# Patient Record
Sex: FEMALE | Race: BLACK OR AFRICAN AMERICAN | Employment: OTHER | URBAN - METROPOLITAN AREA
[De-identification: names, ages, dates, MRNs, and addresses within clinical notes are randomized per-mention and may not be internally consistent; named-entity substitution may affect disease eponyms.]

---

## 2017-03-01 ENCOUNTER — HOSPITAL ENCOUNTER (OUTPATIENT)
Dept: BONE DENSITY | Age: 67
Discharge: HOME | End: 2017-03-01
Payer: COMMERCIAL

## 2017-03-01 DIAGNOSIS — Z13.820 ENCOUNTER FOR SCREENING FOR OSTEOPOROSIS: ICD-10-CM

## 2017-03-01 PROCEDURE — 77080 DXA BONE DENSITY AXIAL: CPT

## 2017-05-22 ENCOUNTER — HOSPITAL ENCOUNTER (OUTPATIENT)
Dept: PREADMISSION TESTING | Age: 67
Discharge: HOME | End: 2017-05-22
Payer: COMMERCIAL

## 2017-05-22 VITALS
DIASTOLIC BLOOD PRESSURE: 88 MMHG | RESPIRATION RATE: 20 BRPM | HEIGHT: 68 IN | WEIGHT: 201.13 LBS | SYSTOLIC BLOOD PRESSURE: 149 MMHG | BODY MASS INDEX: 30.48 KG/M2 | HEART RATE: 69 BPM | TEMPERATURE: 97.9 F

## 2017-05-22 LAB
ABO + RH BLD: NORMAL
ANION GAP BLD CALC-SCNC: 6 MMOL/L (ref 5–15)
APPEARANCE UR: CLEAR
ATRIAL RATE: 70 BPM
BACTERIA URNS QL MICRO: NEGATIVE /HPF
BILIRUB UR QL: NEGATIVE
BLOOD GROUP ANTIBODIES SERPL: NORMAL
BUN SERPL-MCNC: 11 MG/DL (ref 6–20)
BUN/CREAT SERPL: 12 (ref 12–20)
CALCIUM SERPL-MCNC: 9.1 MG/DL (ref 8.5–10.1)
CALCULATED P AXIS, ECG09: 54 DEGREES
CALCULATED R AXIS, ECG10: 49 DEGREES
CALCULATED T AXIS, ECG11: -11 DEGREES
CHLORIDE SERPL-SCNC: 103 MMOL/L (ref 97–108)
CO2 SERPL-SCNC: 32 MMOL/L (ref 21–32)
COLOR UR: ABNORMAL
CREAT SERPL-MCNC: 0.94 MG/DL (ref 0.55–1.02)
DIAGNOSIS, 93000: NORMAL
EPITH CASTS URNS QL MICRO: ABNORMAL /LPF
ERYTHROCYTE [DISTWIDTH] IN BLOOD BY AUTOMATED COUNT: 14.8 % (ref 11.5–14.5)
EST. AVERAGE GLUCOSE BLD GHB EST-MCNC: 126 MG/DL
GLUCOSE SERPL-MCNC: 105 MG/DL (ref 65–100)
GLUCOSE UR STRIP.AUTO-MCNC: NEGATIVE MG/DL
HBA1C MFR BLD: 6 % (ref 4.2–6.3)
HCT VFR BLD AUTO: 39.1 % (ref 35–47)
HGB BLD-MCNC: 12.2 G/DL (ref 11.5–16)
HGB UR QL STRIP: NEGATIVE
HYALINE CASTS URNS QL MICRO: ABNORMAL /LPF (ref 0–5)
INR PPP: 1 (ref 0.9–1.1)
KETONES UR QL STRIP.AUTO: NEGATIVE MG/DL
LEUKOCYTE ESTERASE UR QL STRIP.AUTO: NEGATIVE
MCH RBC QN AUTO: 27.6 PG (ref 26–34)
MCHC RBC AUTO-ENTMCNC: 31.2 G/DL (ref 30–36.5)
MCV RBC AUTO: 88.5 FL (ref 80–99)
NITRITE UR QL STRIP.AUTO: NEGATIVE
P-R INTERVAL, ECG05: 148 MS
PH UR STRIP: 5.5 [PH] (ref 5–8)
PLATELET # BLD AUTO: 260 K/UL (ref 150–400)
POTASSIUM SERPL-SCNC: 4.2 MMOL/L (ref 3.5–5.1)
PROT UR STRIP-MCNC: NEGATIVE MG/DL
PROTHROMBIN TIME: 10.4 SEC (ref 9–11.1)
Q-T INTERVAL, ECG07: 376 MS
QRS DURATION, ECG06: 82 MS
QTC CALCULATION (BEZET), ECG08: 406 MS
RBC # BLD AUTO: 4.42 M/UL (ref 3.8–5.2)
RBC #/AREA URNS HPF: ABNORMAL /HPF (ref 0–5)
SODIUM SERPL-SCNC: 141 MMOL/L (ref 136–145)
SP GR UR REFRACTOMETRY: 1.02 (ref 1–1.03)
SPECIMEN EXP DATE BLD: NORMAL
UA: UC IF INDICATED,UAUC: ABNORMAL
UROBILINOGEN UR QL STRIP.AUTO: 0.2 EU/DL (ref 0.2–1)
VENTRICULAR RATE, ECG03: 70 BPM
WBC # BLD AUTO: 6.2 K/UL (ref 3.6–11)
WBC URNS QL MICRO: ABNORMAL /HPF (ref 0–4)

## 2017-05-22 PROCEDURE — 80048 BASIC METABOLIC PNL TOTAL CA: CPT | Performed by: ORTHOPAEDIC SURGERY

## 2017-05-22 PROCEDURE — 86900 BLOOD TYPING SEROLOGIC ABO: CPT | Performed by: ORTHOPAEDIC SURGERY

## 2017-05-22 PROCEDURE — 83036 HEMOGLOBIN GLYCOSYLATED A1C: CPT | Performed by: ORTHOPAEDIC SURGERY

## 2017-05-22 PROCEDURE — 81001 URINALYSIS AUTO W/SCOPE: CPT | Performed by: ORTHOPAEDIC SURGERY

## 2017-05-22 PROCEDURE — 85610 PROTHROMBIN TIME: CPT | Performed by: ORTHOPAEDIC SURGERY

## 2017-05-22 PROCEDURE — 93005 ELECTROCARDIOGRAM TRACING: CPT

## 2017-05-22 PROCEDURE — 85027 COMPLETE CBC AUTOMATED: CPT | Performed by: ORTHOPAEDIC SURGERY

## 2017-05-22 RX ORDER — OXYCODONE AND ACETAMINOPHEN 10; 325 MG/1; MG/1
1 TABLET ORAL 2 TIMES DAILY
COMMUNITY
End: 2017-05-27

## 2017-05-22 RX ORDER — CETIRIZINE HCL 10 MG
10 TABLET ORAL
Status: ON HOLD | COMMUNITY
End: 2017-05-25

## 2017-05-22 RX ORDER — OMEPRAZOLE 40 MG/1
40 CAPSULE, DELAYED RELEASE ORAL DAILY
COMMUNITY

## 2017-05-23 LAB
BACTERIA SPEC CULT: NORMAL
BACTERIA SPEC CULT: NORMAL
SERVICE CMNT-IMP: NORMAL

## 2017-05-24 ENCOUNTER — ANESTHESIA EVENT (OUTPATIENT)
Dept: SURGERY | Age: 67
DRG: 470 | End: 2017-05-24
Payer: COMMERCIAL

## 2017-05-25 ENCOUNTER — HOSPITAL ENCOUNTER (INPATIENT)
Age: 67
LOS: 2 days | Discharge: HOME HEALTH | DRG: 470 | End: 2017-05-27
Attending: ORTHOPAEDIC SURGERY | Admitting: ORTHOPAEDIC SURGERY
Payer: COMMERCIAL

## 2017-05-25 ENCOUNTER — ANESTHESIA (OUTPATIENT)
Dept: SURGERY | Age: 67
DRG: 470 | End: 2017-05-25
Payer: COMMERCIAL

## 2017-05-25 ENCOUNTER — APPOINTMENT (OUTPATIENT)
Dept: GENERAL RADIOLOGY | Age: 67
DRG: 470 | End: 2017-05-25
Attending: NURSE PRACTITIONER
Payer: COMMERCIAL

## 2017-05-25 PROBLEM — M17.11 OSTEOARTHRITIS OF RIGHT KNEE: Chronic | Status: ACTIVE | Noted: 2017-05-25

## 2017-05-25 PROBLEM — M17.9 OA (OSTEOARTHRITIS) OF KNEE: Status: ACTIVE | Noted: 2017-05-25

## 2017-05-25 LAB
ALBUMIN SERPL BCP-MCNC: 3.1 G/DL (ref 3.5–5)
ALBUMIN/GLOB SERPL: 1 {RATIO} (ref 1.1–2.2)
ALP SERPL-CCNC: 76 U/L (ref 45–117)
ALT SERPL-CCNC: 26 U/L (ref 12–78)
ANION GAP BLD CALC-SCNC: 10 MMOL/L (ref 5–15)
ARTERIAL PATENCY WRIST A: YES
AST SERPL W P-5'-P-CCNC: 22 U/L (ref 15–37)
BASE EXCESS BLD CALC-SCNC: 1 MMOL/L
BASOPHILS # BLD AUTO: 0 K/UL (ref 0–0.1)
BASOPHILS # BLD: 0 % (ref 0–1)
BDY SITE: ABNORMAL
BILIRUB SERPL-MCNC: 0.2 MG/DL (ref 0.2–1)
BUN SERPL-MCNC: 11 MG/DL (ref 6–20)
BUN/CREAT SERPL: 11 (ref 12–20)
CALCIUM SERPL-MCNC: 8.5 MG/DL (ref 8.5–10.1)
CHLORIDE SERPL-SCNC: 106 MMOL/L (ref 97–108)
CO2 SERPL-SCNC: 23 MMOL/L (ref 21–32)
CREAT SERPL-MCNC: 0.98 MG/DL (ref 0.55–1.02)
EOSINOPHIL # BLD: 0 K/UL (ref 0–0.4)
EOSINOPHIL NFR BLD: 0 % (ref 0–7)
ERYTHROCYTE [DISTWIDTH] IN BLOOD BY AUTOMATED COUNT: 14.6 % (ref 11.5–14.5)
GAS FLOW.O2 O2 DELIVERY SYS: ABNORMAL L/MIN
GAS FLOW.O2 SETTING OXYMISER: 15 L/M
GLOBULIN SER CALC-MCNC: 3.2 G/DL (ref 2–4)
GLUCOSE BLD STRIP.AUTO-MCNC: 167 MG/DL (ref 65–100)
GLUCOSE BLD STRIP.AUTO-MCNC: 99 MG/DL (ref 65–100)
GLUCOSE SERPL-MCNC: 155 MG/DL (ref 65–100)
HCO3 BLD-SCNC: 24.4 MMOL/L (ref 22–26)
HCT VFR BLD AUTO: 33.6 % (ref 35–47)
HGB BLD-MCNC: 10.6 G/DL (ref 11.5–16)
LACTATE SERPL-SCNC: 3.9 MMOL/L (ref 0.4–2)
LYMPHOCYTES # BLD AUTO: 12 % (ref 12–49)
LYMPHOCYTES # BLD: 1.3 K/UL (ref 0.8–3.5)
MAGNESIUM SERPL-MCNC: 1.7 MG/DL (ref 1.6–2.4)
MCH RBC QN AUTO: 27.7 PG (ref 26–34)
MCHC RBC AUTO-ENTMCNC: 31.5 G/DL (ref 30–36.5)
MCV RBC AUTO: 88 FL (ref 80–99)
MONOCYTES # BLD: 0.6 K/UL (ref 0–1)
MONOCYTES NFR BLD AUTO: 5 % (ref 5–13)
NEUTS SEG # BLD: 8.9 K/UL (ref 1.8–8)
NEUTS SEG NFR BLD AUTO: 83 % (ref 32–75)
O2/TOTAL GAS SETTING VFR VENT: 100 %
PCO2 BLD: 30 MMHG (ref 35–45)
PH BLD: 7.52 [PH] (ref 7.35–7.45)
PHOSPHATE SERPL-MCNC: 1.9 MG/DL (ref 2.6–4.7)
PLATELET # BLD AUTO: 233 K/UL (ref 150–400)
PO2 BLD: 240 MMHG (ref 80–100)
POTASSIUM SERPL-SCNC: 3.4 MMOL/L (ref 3.5–5.1)
PROT SERPL-MCNC: 6.3 G/DL (ref 6.4–8.2)
RBC # BLD AUTO: 3.82 M/UL (ref 3.8–5.2)
SAO2 % BLD: 100 % (ref 92–97)
SERVICE CMNT-IMP: ABNORMAL
SERVICE CMNT-IMP: NORMAL
SODIUM SERPL-SCNC: 139 MMOL/L (ref 136–145)
SPECIMEN TYPE: ABNORMAL
TROPONIN I SERPL-MCNC: <0.04 NG/ML
WBC # BLD AUTO: 10.7 K/UL (ref 3.6–11)

## 2017-05-25 PROCEDURE — 77030035236 HC SUT PDS STRATFX BARB J&J -B: Performed by: ORTHOPAEDIC SURGERY

## 2017-05-25 PROCEDURE — 74011250636 HC RX REV CODE- 250/636: Performed by: NURSE PRACTITIONER

## 2017-05-25 PROCEDURE — 36600 WITHDRAWAL OF ARTERIAL BLOOD: CPT

## 2017-05-25 PROCEDURE — 77030007866 HC KT SPN ANES BBMI -B: Performed by: ANESTHESIOLOGY

## 2017-05-25 PROCEDURE — 74011000250 HC RX REV CODE- 250: Performed by: ORTHOPAEDIC SURGERY

## 2017-05-25 PROCEDURE — 74011250636 HC RX REV CODE- 250/636

## 2017-05-25 PROCEDURE — 77030034850: Performed by: ORTHOPAEDIC SURGERY

## 2017-05-25 PROCEDURE — 74011250637 HC RX REV CODE- 250/637: Performed by: ORTHOPAEDIC SURGERY

## 2017-05-25 PROCEDURE — 93005 ELECTROCARDIOGRAM TRACING: CPT

## 2017-05-25 PROCEDURE — 76210000016 HC OR PH I REC 1 TO 1.5 HR: Performed by: ORTHOPAEDIC SURGERY

## 2017-05-25 PROCEDURE — 64450 NJX AA&/STRD OTHER PN/BRANCH: CPT

## 2017-05-25 PROCEDURE — 77030018836 HC SOL IRR NACL ICUM -A: Performed by: ORTHOPAEDIC SURGERY

## 2017-05-25 PROCEDURE — 77030011640 HC PAD GRND REM COVD -A: Performed by: ORTHOPAEDIC SURGERY

## 2017-05-25 PROCEDURE — 77030018846 HC SOL IRR STRL H20 ICUM -A: Performed by: ORTHOPAEDIC SURGERY

## 2017-05-25 PROCEDURE — 97161 PT EVAL LOW COMPLEX 20 MIN: CPT

## 2017-05-25 PROCEDURE — C9290 INJ, BUPIVACAINE LIPOSOME: HCPCS | Performed by: ORTHOPAEDIC SURGERY

## 2017-05-25 PROCEDURE — 85025 COMPLETE CBC W/AUTO DIFF WBC: CPT | Performed by: NURSE PRACTITIONER

## 2017-05-25 PROCEDURE — 80053 COMPREHEN METABOLIC PANEL: CPT | Performed by: NURSE PRACTITIONER

## 2017-05-25 PROCEDURE — 84100 ASSAY OF PHOSPHORUS: CPT | Performed by: NURSE PRACTITIONER

## 2017-05-25 PROCEDURE — 65660000000 HC RM CCU STEPDOWN

## 2017-05-25 PROCEDURE — 74011250636 HC RX REV CODE- 250/636: Performed by: ANESTHESIOLOGY

## 2017-05-25 PROCEDURE — 82962 GLUCOSE BLOOD TEST: CPT

## 2017-05-25 PROCEDURE — 74011000250 HC RX REV CODE- 250: Performed by: NURSE PRACTITIONER

## 2017-05-25 PROCEDURE — 74011250636 HC RX REV CODE- 250/636: Performed by: ORTHOPAEDIC SURGERY

## 2017-05-25 PROCEDURE — 77030018822 HC SLV COMPR FT COVD -B

## 2017-05-25 PROCEDURE — 77030006835 HC BLD SAW SAG STRY -B: Performed by: ORTHOPAEDIC SURGERY

## 2017-05-25 PROCEDURE — 84484 ASSAY OF TROPONIN QUANT: CPT | Performed by: NURSE PRACTITIONER

## 2017-05-25 PROCEDURE — 94640 AIRWAY INHALATION TREATMENT: CPT

## 2017-05-25 PROCEDURE — C1713 ANCHOR/SCREW BN/BN,TIS/BN: HCPCS | Performed by: ORTHOPAEDIC SURGERY

## 2017-05-25 PROCEDURE — 74011000258 HC RX REV CODE- 258: Performed by: ORTHOPAEDIC SURGERY

## 2017-05-25 PROCEDURE — 76010000171 HC OR TIME 2 TO 2.5 HR INTENSV-TIER 1: Performed by: ORTHOPAEDIC SURGERY

## 2017-05-25 PROCEDURE — 97116 GAIT TRAINING THERAPY: CPT

## 2017-05-25 PROCEDURE — 0SRC0J9 REPLACEMENT OF RIGHT KNEE JOINT WITH SYNTHETIC SUBSTITUTE, CEMENTED, OPEN APPROACH: ICD-10-PCS | Performed by: ORTHOPAEDIC SURGERY

## 2017-05-25 PROCEDURE — 77030031139 HC SUT VCRL2 J&J -A: Performed by: ORTHOPAEDIC SURGERY

## 2017-05-25 PROCEDURE — 36415 COLL VENOUS BLD VENIPUNCTURE: CPT | Performed by: NURSE PRACTITIONER

## 2017-05-25 PROCEDURE — 3E0T3CZ INTRODUCE REGIONAL ANESTH IN PERIPH NRV, PLEXI, PERC: ICD-10-PCS | Performed by: ANESTHESIOLOGY

## 2017-05-25 PROCEDURE — 82803 BLOOD GASES ANY COMBINATION: CPT

## 2017-05-25 PROCEDURE — 77030012893

## 2017-05-25 PROCEDURE — 77030003601 HC NDL NRV BLK BBMI -A

## 2017-05-25 PROCEDURE — 74011250637 HC RX REV CODE- 250/637: Performed by: NURSE PRACTITIONER

## 2017-05-25 PROCEDURE — 77030029684 HC NEB SM VOL KT MONA -A

## 2017-05-25 PROCEDURE — 76060000035 HC ANESTHESIA 2 TO 2.5 HR: Performed by: ORTHOPAEDIC SURGERY

## 2017-05-25 PROCEDURE — 77030016547 HC BLD SAW SAG1 STRY -B: Performed by: ORTHOPAEDIC SURGERY

## 2017-05-25 PROCEDURE — 83735 ASSAY OF MAGNESIUM: CPT | Performed by: NURSE PRACTITIONER

## 2017-05-25 PROCEDURE — 77030028224 HC PDNG CST BSNM -A: Performed by: ORTHOPAEDIC SURGERY

## 2017-05-25 PROCEDURE — C1776 JOINT DEVICE (IMPLANTABLE): HCPCS | Performed by: ORTHOPAEDIC SURGERY

## 2017-05-25 PROCEDURE — 71010 XR CHEST PORT: CPT

## 2017-05-25 PROCEDURE — 77030020788: Performed by: ORTHOPAEDIC SURGERY

## 2017-05-25 PROCEDURE — 83605 ASSAY OF LACTIC ACID: CPT | Performed by: NURSE PRACTITIONER

## 2017-05-25 PROCEDURE — 77030002933 HC SUT MCRYL J&J -A: Performed by: ORTHOPAEDIC SURGERY

## 2017-05-25 PROCEDURE — 74011000250 HC RX REV CODE- 250

## 2017-05-25 DEVICE — IMPLANTABLE DEVICE: Type: IMPLANTABLE DEVICE | Site: KNEE | Status: FUNCTIONAL

## 2017-05-25 DEVICE — CEMENT BNE 80 GM SYS GENTA CEMEX: Type: IMPLANTABLE DEVICE | Site: KNEE | Status: FUNCTIONAL

## 2017-05-25 DEVICE — COMPONENT PAT 29MM DIA 6MM THCK 3 PEG 3 RND PRI STD CEM NP: Type: IMPLANTABLE DEVICE | Site: KNEE | Status: FUNCTIONAL

## 2017-05-25 DEVICE — COMPONENT KNEE CEM STD POLYETH: Type: IMPLANTABLE DEVICE | Status: FUNCTIONAL

## 2017-05-25 RX ORDER — HYDROXYZINE HYDROCHLORIDE 10 MG/1
10 TABLET, FILM COATED ORAL
Status: DISCONTINUED | OUTPATIENT
Start: 2017-05-25 | End: 2017-05-27 | Stop reason: HOSPADM

## 2017-05-25 RX ORDER — MORPHINE SULFATE 10 MG/ML
2 INJECTION, SOLUTION INTRAMUSCULAR; INTRAVENOUS
Status: DISCONTINUED | OUTPATIENT
Start: 2017-05-25 | End: 2017-05-25 | Stop reason: HOSPADM

## 2017-05-25 RX ORDER — IPRATROPIUM BROMIDE AND ALBUTEROL SULFATE 2.5; .5 MG/3ML; MG/3ML
3 SOLUTION RESPIRATORY (INHALATION)
Status: DISCONTINUED | OUTPATIENT
Start: 2017-05-25 | End: 2017-05-26 | Stop reason: ALTCHOICE

## 2017-05-25 RX ORDER — KETOROLAC TROMETHAMINE 30 MG/ML
15 INJECTION, SOLUTION INTRAMUSCULAR; INTRAVENOUS EVERY 6 HOURS
Status: COMPLETED | OUTPATIENT
Start: 2017-05-25 | End: 2017-05-26

## 2017-05-25 RX ORDER — SODIUM CHLORIDE 0.9 % (FLUSH) 0.9 %
5-10 SYRINGE (ML) INJECTION AS NEEDED
Status: DISCONTINUED | OUTPATIENT
Start: 2017-05-25 | End: 2017-05-27 | Stop reason: HOSPADM

## 2017-05-25 RX ORDER — BUPIVACAINE HYDROCHLORIDE 5 MG/ML
INJECTION, SOLUTION EPIDURAL; INTRACAUDAL AS NEEDED
Status: DISCONTINUED | OUTPATIENT
Start: 2017-05-25 | End: 2017-05-25 | Stop reason: HOSPADM

## 2017-05-25 RX ORDER — DEXAMETHASONE SODIUM PHOSPHATE 4 MG/ML
INJECTION, SOLUTION INTRA-ARTICULAR; INTRALESIONAL; INTRAMUSCULAR; INTRAVENOUS; SOFT TISSUE AS NEEDED
Status: DISCONTINUED | OUTPATIENT
Start: 2017-05-25 | End: 2017-05-25 | Stop reason: HOSPADM

## 2017-05-25 RX ORDER — DEXAMETHASONE SODIUM PHOSPHATE 4 MG/ML
4 INJECTION, SOLUTION INTRA-ARTICULAR; INTRALESIONAL; INTRAMUSCULAR; INTRAVENOUS; SOFT TISSUE
Status: DISCONTINUED | OUTPATIENT
Start: 2017-05-25 | End: 2017-05-27 | Stop reason: HOSPADM

## 2017-05-25 RX ORDER — SODIUM CHLORIDE, SODIUM LACTATE, POTASSIUM CHLORIDE, CALCIUM CHLORIDE 600; 310; 30; 20 MG/100ML; MG/100ML; MG/100ML; MG/100ML
125 INJECTION, SOLUTION INTRAVENOUS CONTINUOUS
Status: DISCONTINUED | OUTPATIENT
Start: 2017-05-25 | End: 2017-05-25 | Stop reason: HOSPADM

## 2017-05-25 RX ORDER — FENTANYL CITRATE 50 UG/ML
50 INJECTION, SOLUTION INTRAMUSCULAR; INTRAVENOUS AS NEEDED
Status: DISCONTINUED | OUTPATIENT
Start: 2017-05-25 | End: 2017-05-25 | Stop reason: HOSPADM

## 2017-05-25 RX ORDER — CEFAZOLIN SODIUM IN 0.9 % NACL 2 G/50 ML
2 INTRAVENOUS SOLUTION, PIGGYBACK (ML) INTRAVENOUS ONCE
Status: COMPLETED | OUTPATIENT
Start: 2017-05-25 | End: 2017-05-25

## 2017-05-25 RX ORDER — CEFAZOLIN SODIUM IN 0.9 % NACL 2 G/50 ML
2 INTRAVENOUS SOLUTION, PIGGYBACK (ML) INTRAVENOUS EVERY 8 HOURS
Status: COMPLETED | OUTPATIENT
Start: 2017-05-25 | End: 2017-05-26

## 2017-05-25 RX ORDER — DIPHENHYDRAMINE HYDROCHLORIDE 50 MG/ML
INJECTION, SOLUTION INTRAMUSCULAR; INTRAVENOUS
Status: DISPENSED
Start: 2017-05-25 | End: 2017-05-26

## 2017-05-25 RX ORDER — THERA TABS 400 MCG
1 TAB ORAL DAILY
Status: DISCONTINUED | OUTPATIENT
Start: 2017-05-27 | End: 2017-05-27 | Stop reason: HOSPADM

## 2017-05-25 RX ORDER — MIDAZOLAM HYDROCHLORIDE 1 MG/ML
0.5 INJECTION, SOLUTION INTRAMUSCULAR; INTRAVENOUS
Status: DISCONTINUED | OUTPATIENT
Start: 2017-05-25 | End: 2017-05-25 | Stop reason: HOSPADM

## 2017-05-25 RX ORDER — FENTANYL CITRATE 50 UG/ML
25 INJECTION, SOLUTION INTRAMUSCULAR; INTRAVENOUS
Status: DISCONTINUED | OUTPATIENT
Start: 2017-05-25 | End: 2017-05-25 | Stop reason: HOSPADM

## 2017-05-25 RX ORDER — WARFARIN 7.5 MG/1
7.5 TABLET ORAL ONCE
Status: COMPLETED | OUTPATIENT
Start: 2017-05-25 | End: 2017-05-25

## 2017-05-25 RX ORDER — SODIUM CHLORIDE 9 MG/ML
50 INJECTION, SOLUTION INTRAVENOUS CONTINUOUS
Status: DISCONTINUED | OUTPATIENT
Start: 2017-05-25 | End: 2017-05-25 | Stop reason: HOSPADM

## 2017-05-25 RX ORDER — PROPOFOL 10 MG/ML
INJECTION, EMULSION INTRAVENOUS
Status: DISCONTINUED | OUTPATIENT
Start: 2017-05-25 | End: 2017-05-25 | Stop reason: HOSPADM

## 2017-05-25 RX ORDER — HYDROMORPHONE HYDROCHLORIDE 1 MG/ML
0.5 INJECTION, SOLUTION INTRAMUSCULAR; INTRAVENOUS; SUBCUTANEOUS
Status: ACTIVE | OUTPATIENT
Start: 2017-05-25 | End: 2017-05-26

## 2017-05-25 RX ORDER — CEVIMELINE HYDROCHLORIDE 30 MG/1
30 CAPSULE ORAL 2 TIMES DAILY
Status: DISCONTINUED | OUTPATIENT
Start: 2017-05-25 | End: 2017-05-27 | Stop reason: HOSPADM

## 2017-05-25 RX ORDER — LIDOCAINE HYDROCHLORIDE 10 MG/ML
0.1 INJECTION, SOLUTION EPIDURAL; INFILTRATION; INTRACAUDAL; PERINEURAL AS NEEDED
Status: DISCONTINUED | OUTPATIENT
Start: 2017-05-25 | End: 2017-05-25 | Stop reason: HOSPADM

## 2017-05-25 RX ORDER — DIPHENHYDRAMINE HYDROCHLORIDE 50 MG/ML
12.5 INJECTION, SOLUTION INTRAMUSCULAR; INTRAVENOUS ONCE
Status: COMPLETED | OUTPATIENT
Start: 2017-05-25 | End: 2017-05-25

## 2017-05-25 RX ORDER — KETOROLAC TROMETHAMINE 30 MG/ML
15 INJECTION, SOLUTION INTRAMUSCULAR; INTRAVENOUS EVERY 6 HOURS
Status: DISCONTINUED | OUTPATIENT
Start: 2017-05-25 | End: 2017-05-25

## 2017-05-25 RX ORDER — POLYETHYLENE GLYCOL 3350 17 G/17G
17 POWDER, FOR SOLUTION ORAL DAILY
Status: DISCONTINUED | OUTPATIENT
Start: 2017-05-26 | End: 2017-05-27 | Stop reason: HOSPADM

## 2017-05-25 RX ORDER — PANTOPRAZOLE SODIUM 40 MG/1
40 TABLET, DELAYED RELEASE ORAL DAILY
Status: DISCONTINUED | OUTPATIENT
Start: 2017-05-26 | End: 2017-05-25

## 2017-05-25 RX ORDER — ONDANSETRON 2 MG/ML
4 INJECTION INTRAMUSCULAR; INTRAVENOUS
Status: ACTIVE | OUTPATIENT
Start: 2017-05-25 | End: 2017-05-26

## 2017-05-25 RX ORDER — SODIUM CHLORIDE 0.9 % (FLUSH) 0.9 %
5-10 SYRINGE (ML) INJECTION EVERY 8 HOURS
Status: DISCONTINUED | OUTPATIENT
Start: 2017-05-26 | End: 2017-05-27 | Stop reason: HOSPADM

## 2017-05-25 RX ORDER — MIDAZOLAM HYDROCHLORIDE 1 MG/ML
1 INJECTION, SOLUTION INTRAMUSCULAR; INTRAVENOUS AS NEEDED
Status: DISCONTINUED | OUTPATIENT
Start: 2017-05-25 | End: 2017-05-25 | Stop reason: HOSPADM

## 2017-05-25 RX ORDER — NALOXONE HYDROCHLORIDE 0.4 MG/ML
0.4 INJECTION, SOLUTION INTRAMUSCULAR; INTRAVENOUS; SUBCUTANEOUS AS NEEDED
Status: DISCONTINUED | OUTPATIENT
Start: 2017-05-25 | End: 2017-05-27 | Stop reason: HOSPADM

## 2017-05-25 RX ORDER — ACETAMINOPHEN 500 MG
500 TABLET ORAL
Status: DISCONTINUED | OUTPATIENT
Start: 2017-05-25 | End: 2017-05-27 | Stop reason: HOSPADM

## 2017-05-25 RX ORDER — MIDAZOLAM HYDROCHLORIDE 1 MG/ML
INJECTION, SOLUTION INTRAMUSCULAR; INTRAVENOUS AS NEEDED
Status: DISCONTINUED | OUTPATIENT
Start: 2017-05-25 | End: 2017-05-25 | Stop reason: HOSPADM

## 2017-05-25 RX ORDER — OXYCODONE HYDROCHLORIDE 5 MG/1
10 TABLET ORAL
Status: DISCONTINUED | OUTPATIENT
Start: 2017-05-25 | End: 2017-05-27 | Stop reason: HOSPADM

## 2017-05-25 RX ORDER — LOSARTAN POTASSIUM 50 MG/1
100 TABLET ORAL DAILY
Status: DISCONTINUED | OUTPATIENT
Start: 2017-05-26 | End: 2017-05-27 | Stop reason: HOSPADM

## 2017-05-25 RX ORDER — AMLODIPINE BESYLATE 5 MG/1
5 TABLET ORAL
Status: DISCONTINUED | OUTPATIENT
Start: 2017-05-26 | End: 2017-05-27 | Stop reason: HOSPADM

## 2017-05-25 RX ORDER — AMLODIPINE BESYLATE 5 MG/1
5 TABLET ORAL
Status: DISCONTINUED | OUTPATIENT
Start: 2017-05-25 | End: 2017-05-25

## 2017-05-25 RX ORDER — THERA TABS 400 MCG
1 TAB ORAL DAILY
Status: DISCONTINUED | OUTPATIENT
Start: 2017-05-26 | End: 2017-05-25

## 2017-05-25 RX ORDER — AMOXICILLIN 250 MG
1 CAPSULE ORAL 2 TIMES DAILY
Status: DISCONTINUED | OUTPATIENT
Start: 2017-05-25 | End: 2017-05-27 | Stop reason: HOSPADM

## 2017-05-25 RX ORDER — FACIAL-BODY WIPES
10 EACH TOPICAL DAILY PRN
Status: DISCONTINUED | OUTPATIENT
Start: 2017-05-27 | End: 2017-05-27 | Stop reason: HOSPADM

## 2017-05-25 RX ORDER — DULOXETIN HYDROCHLORIDE 60 MG/1
60 CAPSULE, DELAYED RELEASE ORAL DAILY
Status: DISCONTINUED | OUTPATIENT
Start: 2017-05-26 | End: 2017-05-27 | Stop reason: HOSPADM

## 2017-05-25 RX ORDER — DEXAMETHASONE SODIUM PHOSPHATE 10 MG/ML
10 INJECTION INTRAMUSCULAR; INTRAVENOUS ONCE
Status: ACTIVE | OUTPATIENT
Start: 2017-05-26 | End: 2017-05-26

## 2017-05-25 RX ORDER — ONDANSETRON 2 MG/ML
4 INJECTION INTRAMUSCULAR; INTRAVENOUS AS NEEDED
Status: DISCONTINUED | OUTPATIENT
Start: 2017-05-25 | End: 2017-05-25 | Stop reason: HOSPADM

## 2017-05-25 RX ORDER — SODIUM CHLORIDE 9 MG/ML
1000 INJECTION, SOLUTION INTRAVENOUS CONTINUOUS
Status: DISCONTINUED | OUTPATIENT
Start: 2017-05-25 | End: 2017-05-25 | Stop reason: HOSPADM

## 2017-05-25 RX ORDER — SODIUM CHLORIDE 9 MG/ML
125 INJECTION, SOLUTION INTRAVENOUS CONTINUOUS
Status: DISCONTINUED | OUTPATIENT
Start: 2017-05-25 | End: 2017-05-25

## 2017-05-25 RX ORDER — LOSARTAN POTASSIUM 50 MG/1
100 TABLET ORAL DAILY
Status: DISCONTINUED | OUTPATIENT
Start: 2017-05-26 | End: 2017-05-25

## 2017-05-25 RX ORDER — HYDROMORPHONE HYDROCHLORIDE 1 MG/ML
0.2 INJECTION, SOLUTION INTRAMUSCULAR; INTRAVENOUS; SUBCUTANEOUS
Status: DISCONTINUED | OUTPATIENT
Start: 2017-05-25 | End: 2017-05-25 | Stop reason: HOSPADM

## 2017-05-25 RX ORDER — ACETAMINOPHEN 325 MG/1
325-650 TABLET ORAL
Status: DISCONTINUED | OUTPATIENT
Start: 2017-05-26 | End: 2017-05-27 | Stop reason: HOSPADM

## 2017-05-25 RX ORDER — OXYCODONE HYDROCHLORIDE 5 MG/1
5 TABLET ORAL
Status: DISCONTINUED | OUTPATIENT
Start: 2017-05-25 | End: 2017-05-27 | Stop reason: HOSPADM

## 2017-05-25 RX ORDER — SODIUM CHLORIDE 0.9 % (FLUSH) 0.9 %
5-10 SYRINGE (ML) INJECTION AS NEEDED
Status: DISCONTINUED | OUTPATIENT
Start: 2017-05-25 | End: 2017-05-25 | Stop reason: HOSPADM

## 2017-05-25 RX ORDER — OXYCODONE AND ACETAMINOPHEN 5; 325 MG/1; MG/1
1 TABLET ORAL AS NEEDED
Status: DISCONTINUED | OUTPATIENT
Start: 2017-05-25 | End: 2017-05-25 | Stop reason: HOSPADM

## 2017-05-25 RX ORDER — SODIUM CHLORIDE 9 MG/ML
125 INJECTION, SOLUTION INTRAVENOUS CONTINUOUS
Status: DISCONTINUED | OUTPATIENT
Start: 2017-05-25 | End: 2017-05-26

## 2017-05-25 RX ORDER — PANTOPRAZOLE SODIUM 40 MG/1
40 TABLET, DELAYED RELEASE ORAL
Status: DISCONTINUED | OUTPATIENT
Start: 2017-05-26 | End: 2017-05-27 | Stop reason: HOSPADM

## 2017-05-25 RX ORDER — ACETAMINOPHEN 325 MG/1
650 TABLET ORAL
Status: DISCONTINUED | OUTPATIENT
Start: 2017-05-25 | End: 2017-05-25

## 2017-05-25 RX ORDER — ONDANSETRON 2 MG/ML
INJECTION INTRAMUSCULAR; INTRAVENOUS AS NEEDED
Status: DISCONTINUED | OUTPATIENT
Start: 2017-05-25 | End: 2017-05-25 | Stop reason: HOSPADM

## 2017-05-25 RX ORDER — SODIUM CHLORIDE 0.9 % (FLUSH) 0.9 %
5-10 SYRINGE (ML) INJECTION EVERY 8 HOURS
Status: DISCONTINUED | OUTPATIENT
Start: 2017-05-25 | End: 2017-05-25 | Stop reason: HOSPADM

## 2017-05-25 RX ORDER — DIPHENHYDRAMINE HYDROCHLORIDE 50 MG/ML
12.5 INJECTION, SOLUTION INTRAMUSCULAR; INTRAVENOUS AS NEEDED
Status: DISCONTINUED | OUTPATIENT
Start: 2017-05-25 | End: 2017-05-25 | Stop reason: HOSPADM

## 2017-05-25 RX ORDER — BACITRACIN ZINC 500 UNIT/G
OINTMENT (GRAM) TOPICAL ONCE
Status: COMPLETED | OUTPATIENT
Start: 2017-05-25 | End: 2017-05-25

## 2017-05-25 RX ADMIN — MIDAZOLAM HYDROCHLORIDE 2 MG: 1 INJECTION, SOLUTION INTRAMUSCULAR; INTRAVENOUS at 07:36

## 2017-05-25 RX ADMIN — IPRATROPIUM BROMIDE AND ALBUTEROL SULFATE 3 ML: .5; 3 SOLUTION RESPIRATORY (INHALATION) at 16:09

## 2017-05-25 RX ADMIN — METHYLPREDNISOLONE SODIUM SUCCINATE 125 MG: 125 INJECTION, POWDER, FOR SOLUTION INTRAMUSCULAR; INTRAVENOUS at 22:30

## 2017-05-25 RX ADMIN — WARFARIN SODIUM 7.5 MG: 7.5 TABLET ORAL at 16:51

## 2017-05-25 RX ADMIN — ACETAMINOPHEN 500 MG: 500 TABLET, FILM COATED ORAL at 21:53

## 2017-05-25 RX ADMIN — SODIUM CHLORIDE, SODIUM LACTATE, POTASSIUM CHLORIDE, AND CALCIUM CHLORIDE 125 ML/HR: 600; 310; 30; 20 INJECTION, SOLUTION INTRAVENOUS at 06:45

## 2017-05-25 RX ADMIN — SODIUM CHLORIDE 125 ML/HR: 900 INJECTION, SOLUTION INTRAVENOUS at 11:11

## 2017-05-25 RX ADMIN — ACETAMINOPHEN 500 MG: 500 TABLET, FILM COATED ORAL at 16:47

## 2017-05-25 RX ADMIN — KETOROLAC TROMETHAMINE 15 MG: 30 INJECTION, SOLUTION INTRAMUSCULAR at 23:19

## 2017-05-25 RX ADMIN — CEFAZOLIN 2 G: 1 INJECTION, POWDER, FOR SOLUTION INTRAMUSCULAR; INTRAVENOUS; PARENTERAL at 07:48

## 2017-05-25 RX ADMIN — MIDAZOLAM HYDROCHLORIDE 3 MG: 1 INJECTION, SOLUTION INTRAMUSCULAR; INTRAVENOUS at 07:38

## 2017-05-25 RX ADMIN — OXYCODONE HYDROCHLORIDE 5 MG: 5 TABLET ORAL at 19:20

## 2017-05-25 RX ADMIN — BUPIVACAINE HYDROCHLORIDE 12 MG: 5 INJECTION, SOLUTION EPIDURAL; INTRACAUDAL at 07:48

## 2017-05-25 RX ADMIN — ONDANSETRON 4 MG: 2 INJECTION INTRAMUSCULAR; INTRAVENOUS at 08:41

## 2017-05-25 RX ADMIN — KETOROLAC TROMETHAMINE 15 MG: 30 INJECTION, SOLUTION INTRAMUSCULAR at 16:42

## 2017-05-25 RX ADMIN — CEVIMELINE HYDROCHLORIDE 30 MG: 30 CAPSULE ORAL at 21:53

## 2017-05-25 RX ADMIN — TRANEXAMIC ACID 1 G: 100 INJECTION, SOLUTION INTRAVENOUS at 07:48

## 2017-05-25 RX ADMIN — SODIUM CHLORIDE 500 ML: 900 INJECTION, SOLUTION INTRAVENOUS at 15:45

## 2017-05-25 RX ADMIN — CEFAZOLIN 2 G: 1 INJECTION, POWDER, FOR SOLUTION INTRAMUSCULAR; INTRAVENOUS; PARENTERAL at 16:25

## 2017-05-25 RX ADMIN — FENTANYL CITRATE 50 MCG: 50 INJECTION, SOLUTION INTRAMUSCULAR; INTRAVENOUS at 07:25

## 2017-05-25 RX ADMIN — DEXAMETHASONE SODIUM PHOSPHATE 4 MG: 4 INJECTION, SOLUTION INTRA-ARTICULAR; INTRALESIONAL; INTRAMUSCULAR; INTRAVENOUS; SOFT TISSUE at 07:57

## 2017-05-25 RX ADMIN — SENNOSIDES AND DOCUSATE SODIUM 1 TABLET: 8.6; 5 TABLET ORAL at 19:20

## 2017-05-25 RX ADMIN — PROPOFOL 60 MCG/KG/MIN: 10 INJECTION, EMULSION INTRAVENOUS at 07:44

## 2017-05-25 RX ADMIN — IPRATROPIUM BROMIDE AND ALBUTEROL SULFATE 3 ML: .5; 3 SOLUTION RESPIRATORY (INHALATION) at 21:50

## 2017-05-25 RX ADMIN — FAMOTIDINE 20 MG: 10 INJECTION, SOLUTION INTRAVENOUS at 22:33

## 2017-05-25 RX ADMIN — DIPHENHYDRAMINE HYDROCHLORIDE 12.5 MG: 50 INJECTION, SOLUTION INTRAMUSCULAR; INTRAVENOUS at 22:39

## 2017-05-25 NOTE — ANESTHESIA PREPROCEDURE EVALUATION
Anesthetic History   No history of anesthetic complications            Review of Systems / Medical History  Patient summary reviewed, nursing notes reviewed and pertinent labs reviewed    Pulmonary  Within defined limits  COPD    Sleep apnea           Neuro/Psych   Within defined limits           Cardiovascular  Within defined limits  Hypertension                   GI/Hepatic/Renal  Within defined limits   GERD           Endo/Other  Within defined limits           Other Findings              Physical Exam    Airway  Mallampati: II  TM Distance: > 6 cm  Neck ROM: normal range of motion   Mouth opening: Normal     Cardiovascular  Regular rate and rhythm,  S1 and S2 normal,  no murmur, click, rub, or gallop             Dental  No notable dental hx       Pulmonary  Breath sounds clear to auscultation               Abdominal  GI exam deferred       Other Findings            Anesthetic Plan    ASA: 2  Anesthesia type: spinal      Post-op pain plan if not by surgeon: peripheral nerve block single    Induction: Intravenous  Anesthetic plan and risks discussed with: Patient

## 2017-05-25 NOTE — PERIOP NOTES
Right adductor canal block performed by Danny Hudson. Oxygen and monitors in place per protocol. Patient tolerated procedure well.

## 2017-05-25 NOTE — H&P
REGINALDO PRE-OP HISTORY AND PHYSICAL    Subjective:     Patient is a 73 y.o. female presented with a history of right knee pain. Onset of symptoms was gradual with gradually worsening course since that time. She is being admitted for surgical management of this condition. Patient Active Problem List    Diagnosis Date Noted    Left knee DJD 03/06/2014     Past Medical History:   Diagnosis Date    Aneurysm [facility]) 1997    BRAIN    Arthritis     Autoimmune disease ([facility] [address]     FIBROMYLGIA , SJOGENS    Chronic obstructive pulmonary disease ([facility] [address]     Chronic pain     fibromyalgia    GERD (gastroesophageal reflux disease)     Hypertension     Other ill-defined conditions     sjogrens    Other ill-defined conditions     fibromuscular DYSPHIA \"NECK VEINS\"    Sleep apnea       Past Surgical History:   Procedure Laterality Date    COLONOSCOPY  5/18/2010         HX GI      COLONOSCOPY    HX HYSTERECTOMY  1990    HX KNEE REPLACEMENT Left 2014    HX ORTHOPAEDIC Right     broken ankle, ORIF (PLATE HAS BEEN REMOVED)    HX TUBAL LIGATION  1977    NEUROLOGICAL PROCEDURE UNLISTED  1/1/1994    brain ANEURYSM --DR Jay Ybarra (COILED, NOT ABLE TO BE \"CLIPPED\")      Prior to Admission medications    Medication Sig Start Date End Date Taking? Authorizing Provider   omeprazole (PRILOSEC) 40 mg capsule Take 40 mg by mouth daily. Yes Historical Provider   tiotropium-olodaterol (Derrek Nicki) 2.5-2.5 mcg/actuation mist Take  by inhalation daily. Yes Historical Provider   oxyCODONE-acetaminophen (PERCOCET 10)  mg per tablet Take 1 Tab by mouth two (2) times a day. Yes Historical Provider   losartan (COZAAR) 100 mg tablet Take 100 mg by mouth daily. Yes Historical Provider   multivitamin (ONE A DAY) tablet Take 1 Tab by mouth daily. Yes Historical Provider   DULoxetine (CYMBALTA) 60 mg capsule Take 60 mg by mouth daily.      Yes Phys MD Mandi   cevimeline (EVOXAC) 30 mg capsule Take 30 mg by mouth two (2) times a day. Indications: XEROSTOMIA SECONDARY TO SJOGREN'S SYNDROME   Yes Historical Provider   amlodipine (NORVASC) 5 mg tablet Take 5 mg by mouth nightly. Yes Historical Provider   buprenorphine (BUTRANS) 10 mcg/hour ptwk by TransDERmal route as needed. Historical Provider     Allergies   Allergen Reactions    Morphine Nausea and Vomiting      Social History   Substance Use Topics    Smoking status: Former Smoker     Packs/day: 1.00     Years: 30.00     Quit date: 1/1/1990    Smokeless tobacco: Never Used    Alcohol use No      Family History   Problem Relation Age of Onset    Arthritis-osteo Mother     Hypertension Mother    Rosalind.Lather Pacemaker Mother     Arthritis-osteo Father     Alcohol abuse Father    Marquitas Merchant Sister     Cancer Brother     Arthritis-osteo Sister     Anesth Problems Neg Hx       Review of Systems  A comprehensive review of systems was negative except for that written in the HPI. Objective:     Patient Vitals for the past 8 hrs:   BP Temp Pulse Resp SpO2 Height Weight   05/25/17 0618 143/79 98.1 °F (36.7 °C) 65 18 97 % 5' 7.5\" (1.715 m) 91.2 kg (201 lb)     Visit Vitals    /79 (BP 1 Location: Right arm, BP Patient Position: At rest)    Pulse 65    Temp 98.1 °F (36.7 °C)    Resp 18    Ht 5' 7.5\" (1.715 m)    Wt 91.2 kg (201 lb)    SpO2 97%    BMI 31.02 kg/m2     General:  Alert, cooperative, no distress, appears stated age. Head:  Normocephalic, without obvious abnormality, atraumatic. Eyes:  Conjunctivae/corneas clear. PERRL, EOMs intact. Fundi benign. Ears:  Normal TMs and external ear canals both ears. Nose: Nares normal. Septum midline. Mucosa normal. No drainage or sinus tenderness. Throat: Lips, mucosa, and tongue normal. Teeth and gums normal.   Neck: Supple, symmetrical, trachea midline, no adenopathy, thyroid: no enlargement/tenderness/nodules, no carotid bruit and no JVD. Back:   Symmetric, no curvature. ROM normal. No CVA tenderness.    Lungs: Clear to auscultation bilaterally. Chest wall:  No tenderness or deformity. Heart:  Regular rate and rhythm, S1, S2 normal, no murmur, click, rub or gallop. Breast Exam:  No tenderness, masses, or nipple abnormality. Abdomen:   Soft, non-tender. Bowel sounds normal. No masses,  No organomegaly. Extremities: Right knee: pain with ROM. NVI. + crepitation   Pulses: 2+ and symmetric all extremities. Skin: Skin color, texture, turgor normal. No rashes or lesions. Lymph nodes: Cervical, supraclavicular, and axillary nodes normal.   Neurologic: CNII-XII intact. Normal strength, sensation and reflexes throughout. Assessment:     Active Problems:    * No active hospital problems. *      Plan:     The various methods of treatment have been discussed with the patient and family. After consideration of risks, benefits and other options for treatment, the patient has consented to surgical intervention. Risks of infection, DVT, PE, MI, CVA and unforeseen events described to the patient. Additionally discussed the possibility of not being able to resolve all preop pain. Patient understands metal and plastic will be implanted in the body and understands the potential for revision surgery. Patient wishes to proceed with elective surgery.

## 2017-05-25 NOTE — BRIEF OP NOTE
BRIEF OPERATIVE NOTE    Date of Procedure: 5/25/2017   Preoperative Diagnosis: OSTEOARTHRITIS LEFT KNEE   Postoperative Diagnosis: OSTEOARTHRITIS LEFT KNEE     Procedure(s):  RIGHT TOTAL KNEE ARTHROPLASTY    Surgeon(s) and Role:     * Mary Bauman MD - Primary     * Juan J Guadarrama MD - Fellow         Assistant Staff:       Surgical Staff:  Circ-1: Babs Akers RN  Circ-Relief: Dorota Woods RN  Scrub Tech-1: Layvoadriennee Amirah  Surg Asst-1: Emily Davey  Event Time In   Incision Start 0805   Incision Close      Anesthesia: General   Estimated Blood Loss: <150  Specimens: bone discarded   Findings: oa   Complications: none  Implants:   Implant Name Type Inv.  Item Serial No.  Lot No. LRB No. Used Action   CEMENT BNE [employer]AM[employer] CEMEX 80GM -- 1500/SG US - SNA  CEMENT BNE GENTAM[employer] CEMEX 80GM -- 1500/SG US NA [employer] [id number] Right 1 Implanted   INSERT 3-PEG PAT 29MM --  - [id number]  INSERT 3-PEG PAT 29MM --  [id number] [employer] NA Right 1 Implanted   TRAY FIT TIB LORAINE SZ 2.5F/1.5T -- [employer] REV - [id number]  TRAY FIT TIB LORAINE SZ 2.5F/1.5T -- [employer] REV [id number] [employer] NA Right 1 Implanted   INSERT FEM PS LORAINE SZ2.5 RT -- [employer] - [id number]  INSERT FEM PS LORAINE SZ2.5 RT -- [employer] [id number] [employer] NA Right 1 Implanted   INSERT TIB PS MOD SZ2.5 11MM -- [employer] - [id number]   INSERT TIB PS MOD SZ2.5 11MM -- [employer] [id number] [employer] NA Right 1 Implanted

## 2017-05-25 NOTE — PROGRESS NOTES
Primary Nurse Ja Thomas and Vandana Horner, RICKY performed a dual skin assessment on this patient No impairment noted  Dez score is 20

## 2017-05-25 NOTE — PERIOP NOTES
Patient: Aydin Vasquez MRN: [id number]  SSN: [id number]   YOB: 1950  Age: 73 y.o. Sex: female     Patient is status post Procedure(s):  RIGHT TOTAL KNEE ARTHROPLASTY  . Surgeon(s) and Role:     * Esdras Edmonds MD - Primary     * Farrukh Ramirez MD - Fellow    Local/Dose/Irrigation:  See mar                  Peripheral IV 10/12/12 Left Antecubital (Active)       Peripheral IV 05/25/17 Right Hand (Active)                           Dressing/Packing:  Wound Knee Right-DRESSING TYPE: 4 x 4;ABD pad; Adhesive wound closure strips (Steri-Strips); Cast padding;Elastic bandage; Oil emulsion (05/25/17 0700)  Splint/Cast:  ]    Other:

## 2017-05-25 NOTE — PROGRESS NOTES
Problem: Mobility Impaired (Adult and Pediatric)  Goal: *Acute Goals and Plan of Care (Insert Text)  Physical Therapy Goals  Initiated 5/25/2017    1. Patient will move from supine to sit and sit to supine in bed with independence within 4 days. 2. Patient will perform sit to stand with modified independence within 4 days. 3. Patient will ambulate with modified independence for 150 feet with the least restrictive device within 4 days. 4. Patient will ascend/descend 6 stairs with 2 handrail(s) with supervision/set-up within 4 days. 5. Patient will perform home exercise program per protocol with modified independence within 4 days. 6. Patient will demonstrate AROM 0-90 degrees in operative joint within 4 days. PHYSICAL THERAPY EVALUATION  Patient: Manny Yoon (8 y.o. female)  Date: 5/25/2017  Primary Diagnosis: OA LEFT KNEE   OA (osteoarthritis) of knee  Procedure(s) (LRB):  RIGHT TOTAL KNEE ARTHROPLASTY   (Right) Day of Surgery   Precautions: WBAT LLE         ASSESSMENT :  Based on the objective data described below, the patient presents with  decreased independence with mobility compared to baseline level prior to admission due to decreased strength and ROM. Patient cleared by nursing for mobility and agreeable to participate in POD #0 mobility. Patient BP in supine 147/88. Pt reporting to this therapist that she became very orhtostatic with therapy after her last knee replacement 3 years agol. Patient able to perform bed mobility and achieve EOB sitting with min assist. PT sitting EOB x 3-4 minutes. /83. Patient performed sit<>stand tx with min assist and demonstrates even WB bilaterally and good immediate standing balance. However within 2 minutes standing EOB, patient began to feel light headed. Attempted to get standing BP, however patient became very pale and eyelids fluttering. Pt assisted quickly but safely back to supine with assist x 2. /70 in supine.  Pt instructed on ankle pumps and within 2 minutes, BP up to 124/77. Of note, patient began sweating excessively and shaking. Nursing notified, however episode resolved quickly. Not safe to ambulate at this time. Of note for next therapy session, patient upright for 5 minutes prior to becoming orthostatic. Patient reportedly attended pre operative joint replacement education class and has binder present. Physical therapist reviewed bed exercises and acute care expectations with patient. Patient able to correctly perform ankle pumps, heel slides, and quad sets and has no additional questions. Patient has equipment needed and has walker present in hospital today. Will continue to follow to progress towards acute care goals. Recommend HHPT at d/c to continue to optimize independence with mobility. Patient will benefit from skilled intervention to address the above impairments. Patients rehabilitation potential is considered to be Good  Factors which may influence rehabilitation potential include:   [X]         None noted  [ ]         Mental ability/status  [ ]         Medical condition  [ ]         Home/family situation and support systems  [ ]         Safety awareness  [ ]         Pain tolerance/management  [ ]         Other:        PLAN :  Recommendations and Planned Interventions:  [X]           Bed Mobility Training             [ ]    Neuromuscular Re-Education  [X]           Transfer Training                   [ ]    Orthotic/Prosthetic Training  [X]           Gait Training                         [ ]    Modalities  [X]           Therapeutic Exercises           [ ]    Edema Management/Control  [X]           Therapeutic Activities            [ ]    Patient and Family Training/Education  [ ]           Other (comment):     Frequency/Duration: Patient will be followed by physical therapy  twice daily to address goals.   Discharge Recommendations: Home Health  Further Equipment Recommendations for Discharge: none, has RW present SUBJECTIVE:   Patient stated Carolyne Moore does this happen every time? Mauricio Larios      OBJECTIVE DATA SUMMARY:   HISTORY:    Past Medical History:   Diagnosis Date    Aneurysm ([facility] [address] 1997     BRAIN    Arthritis      Autoimmune disease ([facility] [address]       FIBROMYLGIA , SJOGENS    Chronic obstructive pulmonary disease ([facility] [address]      Chronic pain       fibromyalgia    GERD (gastroesophageal reflux disease)      Hypertension      Other ill-defined conditions       sjogrens    Other ill-defined conditions       fibromuscular DYSPHIA \"NECK VEINS\"    Sleep apnea       Past Surgical History:   Procedure Laterality Date    COLONOSCOPY   5/18/2010          HX GI         COLONOSCOPY    HX HYSTERECTOMY   1990    HX KNEE REPLACEMENT Left 2014    HX ORTHOPAEDIC Right       broken ankle, ORIF (PLATE HAS BEEN REMOVED)    HX TUBAL LIGATION   1977    NEUROLOGICAL PROCEDURE UNLISTED   1/1/1994     brain ANEURYSM --DR Liza Martínez (COILED, NOT ABLE TO BE \"CLIPPED\")     Prior Level of Function/Home Situation: Pt lives with her son in a single level house. Previously ambulating without device. Personal factors and/or comorbidities impacting plan of care:      Home Situation  Home Environment: Private residence  # Steps to Enter: 6  Rails to Enter: Yes  Hand Rails : Bilateral  Wheelchair Ramp: No  One/Two Story Residence: Two story, live on 1st floor  Living Alone: No  Support Systems: Child(andria), Family member(s)  Patient Expects to be Discharged to[de-identified] Private residence  Current DME Used/Available at Home: Walker, rolling     EXAMINATION/PRESENTATION/DECISION MAKING:   Critical Behavior:  Neurologic State: Alert  Orientation Level: Oriented X4  Cognition: Appropriate for age attention/concentration     Hearing:   Auditory  Auditory Impairment: None  Strength:    Strength: Generally decreased, functional                    Tone & Sensation:   Tone: Normal              Sensation: Intact               Coordination:  Coordination: Within functional limits  Vision:      Functional Mobility:  Bed Mobility:     Supine to Sit: Minimum assistance  Sit to Supine: Minimum assistance;Assist x2     Transfers:  Sit to Stand: Minimum assistance;Assist x1;Additional time; Adaptive equipment  Stand to Sit: Minimum assistance;Assist x1                       Balance:   Sitting: Intact  Standing: Intact; With support        Pain:  Pain Scale 1: Numeric (0 - 10)  Pain Intensity 1: 0  Pain Location 1: Knee  Pain Orientation 1: Right  Pain Description 1: Aching  Pain Intervention(s) 1: Rest  Activity Tolerance:   Please refer to the flowsheet for vital signs taken during this treatment. After treatment:   [ ]         Patient left in no apparent distress sitting up in chair  [X]         Patient left in no apparent distress in bed  [X]         Call bell left within reach  [X]         Nursing notified  [ ]         Caregiver present  [ ]         Bed alarm activated      COMMUNICATION/EDUCATION:   The patients plan of care was discussed with: Registered Nurse.  [X]         Fall prevention education was provided and the patient/caregiver indicated understanding. [X]         Patient/family have participated as able in goal setting and plan of care. [X]         Patient/family agree to work toward stated goals and plan of care. [ ]         Patient understands intent and goals of therapy, but is neutral about his/her participation. [ ]         Patient is unable to participate in goal setting and plan of care.      Thank you for this referral.  Danielle Carr PT, DPT   Time Calculation: 16 mins

## 2017-05-25 NOTE — PROGRESS NOTES
TRANSFER - OUT REPORT:    Verbal report given to Raheem Owens RN(name) on Mickiel Scarce  being transferred to 555(unit) for routine post - op       Report consisted of patients Situation, Background, Assessment and   Recommendations(SBAR). Time Pre op antibiotic given:7:48 Ancef  Anesthesia Stop time: 10:06  Manriquez Present on Transfer to floor:yes  Order for Manriquez on Chart:yes    Information from the following report(s) SBAR, Kardex, OR Summary, Procedure Summary, Intake/Output and MAR was reviewed with the receiving nurse. Opportunity for questions and clarification was provided. Is the patient on 02? NO       L/Min        Other     Is the patient on a monitor? NO    Is the nurse transporting with the patient? NO    Surgical Waiting Area notified of patient's transfer from PACU? YES      The following personal items collected during your admission accompanied patient upon transfer:   Dental Appliance:    Vision:    Hearing Aid:    Jewelry:    Clothing: Clothing: Other (comment) (clothing bag to pacu)  Other Valuables:  Other Valuables: Eyeglasses (glasses to pacu)  Valuables sent to safe:

## 2017-05-25 NOTE — ANESTHESIA POSTPROCEDURE EVALUATION
Post-Anesthesia Evaluation and Assessment    Patient: Alfredo Contreras MRN: [id number]  SSN: [id number]    YOB: 1950  Age: 73 y.o. Sex: female       Cardiovascular Function/Vital Signs  Visit Vitals    /67    Pulse 67    Temp 36.4 °C (97.5 °F)    Resp 17    Ht 5' 7.5\" (1.715 m)    Wt 91.2 kg (201 lb)    SpO2 97%    BMI 31.02 kg/m2       Patient is status post spinal anesthesia for Procedure(s):  RIGHT TOTAL KNEE ARTHROPLASTY  . Nausea/Vomiting: None    Postoperative hydration reviewed and adequate. Pain:  Pain Scale 1: Numeric (0 - 10) (05/25/17 1010)  Pain Intensity 1: 0 (05/25/17 1010)   Managed    Neurological Status:   Neuro (WDL): Within Defined Limits (05/25/17 1010)  Neuro  LUE Motor Response: Purposeful (05/25/17 1010)  LLE Motor Response: Purposeful (05/25/17 1010)  RUE Motor Response: Purposeful (05/25/17 1010)  RLE Motor Response: Purposeful (05/25/17 1010)   At baseline    Mental Status and Level of Consciousness: Arousable    Pulmonary Status:   O2 Device: Nasal cannula (05/25/17 1007)   Adequate oxygenation and airway patent    Complications related to anesthesia: None    Post-anesthesia assessment completed.  No concerns    Signed By: Tuan Navarro MD     May 25, 2017

## 2017-05-25 NOTE — PROGRESS NOTES
NP ORTHO PROGRESS NOTE  Post Op day: Day of Surgery    May 25, 2017 4:18 PM     Fabiola Vernon  [id number]  female  73 y.o.   1950    Admit date: 5/25/2017  Date of Surgery: 5/25/2017   Procedures: Procedure(s):  RIGHT TOTAL KNEE ARTHROPLASTY    Admitting Physician: Matt Dacosta MD   Surgeon: Linda Da Silva) and Role:     * Matt Dacosta MD - Primary     Marii Tsai MD - Fellow    Chart/Meds/Labs Reviewed      Subjective:    Complaints: Very Dizzy & pre-syncope feeling when OOB with PT  Denies Present Dizziness, CP, SOB, Wheezing,  N/V, Abdominal pain, numbness or tingling of extremities. Able to perform ankle pumps easily. Incisional pain control: well controlle  Pain Control:   Pain Assessment  Pain Scale 1: Numeric (0 - 10)  Pain Intensity 1: 0  Pain Location 1: Knee  Pain Orientation 1: Right  Pain Description 1: Aching  Pain Intervention(s) 1: Rest    Oral diet: Tolerating clears & some crackers diet well    Objective:  General: Alert,Ox4, cooperative, NAD  Able to sit with HOB elevated 90 degrees at time of exam.    HEENT: Atraumatic, PERRL, anicteric sclerae  Lungs: Bilateral expansion. Equal excursion. No accessory muscle use. Gastrointestinal:  Soft, non-tender, non-distended  Extremities:  Neurovasc exam WDL. + DP pulses. Sensation intact to light touch. Motor: + DF/PF          Calves non-tender upon palpation or with passive stretch. No significant erythema or swelling. Bulky acewrapped Dressing: clean, dry and intact.  Ice packs to site  NS IV infusing peripherally   Vital Signs:   Visit Vitals    /69 (BP 1 Location: Left arm, BP Patient Position: At rest)    Pulse 67    Temp 97.1 °F (36.2 °C)    Resp 18    Ht 5' 7.5\" (1.715 m)    Wt 91.2 kg (201 lb)    SpO2 96%    BMI 31.02 kg/m2    O2 Flow Rate (L/min): 2 l/min O2 Device: Room air   Patient Vitals for the past 24 hrs:   BP Temp Pulse Resp SpO2 Height Weight   05/25/17 1609 - - - - 96 % - - 17 1531 109/69 97.1 °F (36.2 °C) 67 18 96 % - -   17 1515 109/70 - - - - - -   17 1500 157/85 - - - - - -   17 1424 152/87 98.1 °F (36.7 °C) 80 18 97 % - -   17 1328 143/86 98.1 °F (36.7 °C) 72 18 97 % - -   17 1300 136/71 - 74 20 96 % - -   17 1255 - - 74 16 95 % - -   17 1245 145/73 - 72 17 95 % - -   17 1230 137/72 - 68 14 95 % - -   17 1215 134/72 - 67 13 94 % - -   17 1200 137/81 - 67 14 93 % - -   17 1145 132/74 - 66 14 94 % - -   17 1130 125/74 - 68 14 94 % - -   17 1115 141/75 - 64 14 94 % - -   17 1100 141/71 97.2 °F (36.2 °C) 67 17 96 % - -   17 1045 139/74 - 67 19 100 % - -   17 1030 132/75 - 67 19 99 % - -   17 1020 134/67 - 67 17 97 % - -   17 1015 125/71 - 69 18 96 % - -   17 1010 123/70 - 70 18 97 % - -   17 1008 - - 69 18 98 % - -   17 1007 120/80 97.5 °F (36.4 °C) 69 16 98 % - -   17 1006 119/72 - - - - - -   17 0618 143/79 98.1 °F (36.7 °C) 65 18 97 % 5' 7.5\" (1.715 m) 91.2 kg (201 lb)     Temp (24hrs), Av.7 °F (36.5 °C), Min:97.1 °F (36.2 °C), Max:98.1 °F (36.7 °C)      Intake/output-last 8 hours:    0701 -  1900  In: 8050 [I.V.:1550]  Out: 1450 [Urine:1350]    Intake/output- 24 hours:       LAB:   Recent Results (from the past 24 hour(s))   GLUCOSE, POC    Collection Time: 17  6:46 AM   Result Value Ref Range    Glucose (POC) 99 65 - 100 mg/dL    Performed by Zeenat Cochran       Lab Results   Component Value Date/Time    INR 1.0 2017 08:16 AM    INR 1.1 03/10/2014 01:45 AM    INR 1.1 2014 03:30 AM    INR 1.2 2014 03:34 AM     Lab Results   Component Value Date/Time    HGB 12.2 2017 08:16 AM    HGB 9.9 03/10/2014 01:45 AM    HGB 9.3 2014 03:34 AM    HGB 9.8 2014 02:41 AM     No results for input(s): NA, K, CL, BUN, CREA, GLU, CA, MG, PHOS, ALB, TBIL, TBILI, SGOT in the last 72 hours.     No lab exists for component: ALT;3    PT/OT:   Gait:                    PATIENT MOBILITY  Bed Mobility Training  Supine to Sit: Minimum assistance  Sit to Supine: Minimum assistance, Assist x2  Transfer Training  Sit to Stand: Minimum assistance, Assist x1, Additional time, Adaptive equipment  Stand to Sit: Minimum assistance, Assist x1                   Assessment and Plan    Principal Problem:    Osteoarthritis of right knee (5/25/2017)          POD#0 Procedure(s):  RIGHT TOTAL KNEE ARTHROPLASTY    Orthostasis with pre-syncope with PT & BP on low side. NS bolus running IV. Symptoms subsiding. VS stabilizing. Hold parameters for BP meds. No indications of bleeding or other issues. Continue to monitor hemodynamics carefully Labs trending as expected. VTE prophylaxis:Warfarin, Mobilization, Ankle pumping exercises, SCDs per order if not able to exercise or mobilize well. Weight bearing:  WBAT  Hx COPD: stable, RT treatments  Pain management:  Multi-modal pain plan, see med list,  Ice packs & elevation of extremity per orders, active gentle ROM & mobilize frequently for short periods of time as tolerated. PT as noted--watch for orthostasis next time OOB  Wound benign. Neurovascularly intact. Progressing within expected postop course. Continue present plans per orthopedic attending surgeon, Dr. Yulia Jones & interdisciplinary team for joint replacement.         Signed By: Abimael Payne NP    RN, MSN, MA, Adult NP-BC

## 2017-05-25 NOTE — ANESTHESIA PROCEDURE NOTES
Spinal Block    Start time: 5/25/2017 7:41 AM  End time: 5/25/2017 7:45 AM  Performed by: MONIQUE Tian  Authorized by: MONIQUE Tian     Pre-procedure:   Indications: primary anesthetic  Preanesthetic Checklist: patient identified, risks and benefits discussed, anesthesia consent, site marked, patient being monitored and timeout performed    Timeout Time: 07:40          Spinal Block:   Patient Position:  Seated  Prep Region:  Lumbar  Prep: Betadine      Location:  L3-4  Technique:  Single shot        Needle:   Needle Type:  Pencil-tip  Needle Gauge:  24 G  Attempts:  1      Events: CSF confirmed, no blood with aspiration and no paresthesia        Assessment:  Insertion:  Uncomplicated  Patient tolerance:  Patient tolerated the procedure well with no immediate complications

## 2017-05-25 NOTE — IP AVS SNAPSHOT
Current Discharge Medication List  
  
START taking these medications Dose & Instructions Dispensing Information Comments Morning Noon Evening Bedtime  
 oxyCODONE IR 5 mg immediate release tablet Commonly known as:  Cleo Feng Your last dose was: Your next dose is:    
   
   
 Dose:  10 mg Take 2 Tabs by mouth every four (4) hours as needed for Pain. Max Daily Amount: 60 mg.  
 Quantity:  60 Tab Refills:  0  
     
   
   
   
  
 warfarin 2.5 mg tablet Commonly known as:  COUMADIN Your last dose was: Your next dose is:    
   
   
 Dose:  2.5 mg Take 1 Tab by mouth daily. Quantity:  60 Tab Refills:  3 CONTINUE these medications which have NOT CHANGED Dose & Instructions Dispensing Information Comments Morning Noon Evening Bedtime  
 amLODIPine 5 mg tablet Commonly known as:  Roslyn Howard Your last dose was: Your next dose is:    
   
   
 Dose:  5 mg Take 5 mg by mouth nightly. Refills:  0  
     
   
   
   
  
 CYMBALTA 60 mg capsule Generic drug:  DULoxetine Your last dose was: Your next dose is:    
   
   
 Dose:  60 mg Take 60 mg by mouth daily. Refills:  0  
     
   
   
   
  
 EVOXAC 30 mg capsule Generic drug:  cevimeline Your last dose was: Your next dose is:    
   
   
 Dose:  30 mg Take 30 mg by mouth two (2) times a day. 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  
     
   
   
   
  
 losartan 100 mg tablet Commonly known as:  COZAAR Your last dose was: Your next dose is:    
   
   
 Dose:  100 mg Take 100 mg by mouth daily. Refills:  0  
     
   
   
   
  
 multivitamin tablet Commonly known as:  ONE A DAY Your last dose was: Your next dose is:    
   
   
 Dose:  1 Tab Take 1 Tab by mouth daily. Refills:  0  
     
   
   
   
  
 omeprazole 40 mg capsule Commonly known as:  PRILOSEC Your last dose was: Your next dose is:    
   
   
 Dose:  40 mg Take 40 mg by mouth daily. Refills:  0 STIOLTO RESPIMAT 2.5-2.5 mcg/actuation Mist  
Generic drug:  tiotropium-olodaterol Your last dose was: Your next dose is: Take  by inhalation daily. Refills:  0 STOP taking these medications   
 oxyCODONE-acetaminophen  mg per tablet Commonly known as:  PERCOCET 10 Where to Get Your Medications Information on where to get these meds will be given to you by the nurse or doctor. ! Ask your nurse or doctor about these medications  
  oxyCODONE IR 5 mg immediate release tablet  
 warfarin 2.5 mg tablet

## 2017-05-25 NOTE — PROGRESS NOTES
Pharmacist Note  Warfarin Dosing  Consult provided for this 73 y.o. female to manage warfarin for VTE prophylaxis s/p orthopedic surgery    INR Goal: 1.7- 2.2    Therapy Day: 1    Preop Dose: Rebbecca Lalit- none    Drugs that may increase INR: None  Drugs that may decrease INR: None  Other current anticoagulants/ drugs that may increase bleeding risk: NSAID x 4 doses  Risk factors: Age > 65  Daily INR ordered: YES    No results for input(s): HGB, INR, HGBEXT in the last 72 hours. No lab exists for component: INREXT    Date               INR                 Dose  5/22  1.0  -  5/25                 --                    7.5 mg    Assessment/ Plan: Will order warfarin 7.5 mg PO x 1 dose. Pharmacy will continue to monitor daily and adjust therapy as indicated.

## 2017-05-25 NOTE — ANESTHESIA PROCEDURE NOTES
Peripheral Block    Start time: 5/25/2017 7:23 AM  End time: 5/25/2017 7:30 AM  Performed by: MONIQUE Gan  Authorized by: MONIQUE Gan       Pre-procedure: Indications: at surgeon's request and post-op pain management    Preanesthetic Checklist: patient identified, risks and benefits discussed, site marked, timeout performed, anesthesia consent given and patient being monitored      Block Type:   Block Type:   Adductor canal  Laterality:  Right  Monitoring:  Standard ASA monitoring, continuous pulse ox, frequent vital sign checks, heart rate, responsive to questions and oxygen  Injection Technique:  Single shot  Procedures: ultrasound guided    Patient Position: supine  Prep: chlorhexidine    Needle Type:  Stimuplex  Needle Gauge:  22 G  Needle Localization:  Ultrasound guidance  Medication Injected:  0.5%  ropivacaine  Volume (mL):  20    Assessment:  Number of attempts:  1  Injection Assessment:  Incremental injection every 5 mL, local visualized surrounding nerve on ultrasound, negative aspiration for blood, no paresthesia and no intravascular symptoms  Patient tolerance:  Patient tolerated the procedure well with no immediate complications

## 2017-05-25 NOTE — IP AVS SNAPSHOT
[address] 
[phone] Patient: Manny Yoon MRN: [id number] ZLB:1[birth year]0/[birth year]14 You are allergic to the following Allergen Reactions Morphine Nausea and Vomiting Recent Documentation Height Weight BMI OB Status Smoking Status 1.715 m 91.2 kg 31.02 kg/m2 Hysterectomy Former Smoker Emergency Contacts Name Discharge Info Relation Home Work Mobile Aasa 47 CAREGIVER [3] Son [22] [phone] Ingvald Laurenys Crowheart 89 CAREGIVER [3] Daughter [21] [phone] About your hospitalization You were admitted on:  May 25, 2017 You last received care in the:  [address] [place] [address] You were discharged on:  May 27, 2017 Unit phone number:  [phone] Why you were hospitalized Your primary diagnosis was:  Osteoarthritis Of Right Knee Providers Seen During Your Hospitalizations Provider Role Specialty Primary office phone Shaheen Lyon MD Attending Provider Orthopedic Surgery [phone] Your Primary Care Physician (PCP) Primary Care Physician Office Phone Office Fax Shashank Montenegro [phone] Follow-up Information Follow up With Details Comments Contact Info Devaughn Castleman, MD   [address] [place] [address] [address] [facility] 
[phone] [facility] On 5/27/2017 Home health, please call office if you have not heard from staff member by 12 noon first full day after  [address] [place] [address] 
[phone] On 5/28/2017 Current Discharge Medication List  
  
START taking these medications Dose & Instructions Dispensing Information Comments Morning Noon Evening Bedtime  
 oxyCODONE IR 5 mg immediate release tablet Commonly known as:  [place] Muster Your last dose was:     
   
Your next dose is:    
   
   
 Dose:  10 mg  
 Take 2 Tabs by mouth every four (4) hours as needed for Pain. Max Daily Amount: 60 mg.  
 Quantity:  60 Tab Refills:  0  
     
   
   
   
  
 warfarin 2.5 mg tablet Commonly known as:  COUMADIN Your last dose was: Your next dose is:    
   
   
 Dose:  2.5 mg Take 1 Tab by mouth daily. Quantity:  60 Tab Refills:  3 CONTINUE these medications which have NOT CHANGED Dose & Instructions Dispensing Information Comments Morning Noon Evening Bedtime  
 amLODIPine 5 mg tablet Commonly known as:  Radha Warnero Your last dose was: Your next dose is:    
   
   
 Dose:  5 mg Take 5 mg by mouth nightly. Refills:  0  
     
   
   
   
  
 CYMBALTA 60 mg capsule Generic drug:  DULoxetine Your last dose was: Your next dose is:    
   
   
 Dose:  60 mg Take 60 mg by mouth daily. Refills:  0  
     
   
   
   
  
 EVOXAC 30 mg capsule Generic drug:  cevimeline Your last dose was: Your next dose is:    
   
   
 Dose:  30 mg Take 30 mg by mouth two (2) times a day. 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  
     
   
   
   
  
 losartan 100 mg tablet Commonly known as:  COZAAR Your last dose was: Your next dose is:    
   
   
 Dose:  100 mg Take 100 mg by mouth daily. Refills:  0  
     
   
   
   
  
 multivitamin tablet Commonly known as:  ONE A DAY Your last dose was: Your next dose is:    
   
   
 Dose:  1 Tab Take 1 Tab by mouth daily. Refills:  0  
     
   
   
   
  
 omeprazole 40 mg capsule Commonly known as:  PRILOSEC Your last dose was: Your next dose is:    
   
   
 Dose:  40 mg Take 40 mg by mouth daily. Refills:  0 STIOLTO RESPIMAT 2.5-2.5 mcg/actuation Mist  
Generic drug:  tiotropium-olodaterol Your last dose was: Your next dose is: Take  by inhalation daily. Refills:  0 STOP taking these medications   
 oxyCODONE-acetaminophen  mg per tablet Commonly known as:  PERCOCET 10 Where to Get Your Medications Information on where to get these meds will be given to you by the nurse or doctor. ! Ask your nurse or doctor about these medications  
  oxyCODONE IR 5 mg immediate release tablet  
 warfarin 2.5 mg tablet Discharge Instructions [facility]. Total Knee Replacement Post-Op Discharge Instructions Dr. Pavan Bass Diet Eat a regular diet. Drink plenty of fluids. Eat foods high in fiber and protein and low in fat. Avoid alcoholic beverages. Avoid smoking. Activities You are going home a well person, so be as active as possible. Walk with your walker or crutches. Continue using your walker or crutches until seen for your follow-up visit. Avoid low chairs and slippery surfaces. Avoid twisting your knee. Do not sit longer than 45 minutes at a time. During the daytime, get up every hour and take a brief walk. Exercises Perform your exercise routine 3 times a day as instructed by the physical therapist.  Gradually increase the repetitions of the exercises. You may place an icebag on your knee for 5-10 minutes after exercising. Please place a cloth between the ice and skin. You should walk daily, each time lengthening your walking distance as your strength improves. Medications Take Coumadin daily to prevent blood clots. Do not take aspirin or anti-inflammatory medicines while you are taking Coumadin. Take a multivitamin with iron once a day for a month. You may need to take a laxative or stool-softener if you experience constipation. You will be given a prescription for roxicodone for pain. Take as directed. Take pain medication with food.   You will find that you will decrease the amount you use as your pain lessens. Incision Care You will have Steri strips (tapes) on your knee incision. Do not remove them. They will come off on their own. You may take a shower when your incision is dry, generally about a week after surgery. It is OK to let the water run over your incision, but do not soak the knee in water. You should keep a dressing on your wound if there is any drainage; change daily. Wear your elastic stockings for 4 weeks. You may remove them for approximately 1 hour when showering/sponge-bathing. Do not shower if the wound has drainage. Follow-up office visit See Dr. Moiz Godoy in 2-3 weeks. Call to make an appointment:  [phone]. Reasons to call the doctor 1. Increased redness, swelling, or drainage from your incision site. 2.  Temperature consistently greater than 102 degrees. 3.  Increased pain or unrelieved pain. 4.  Calf or chest pain. Home Health Physical therapy - routine exercises, transfers, gait training, active straight leg raises, 3 times a week for 3 weeks. Home nursing - draw a pro-time every Wednesday for 3 weeks. Fax the results to Dr. Moiz Godoy at [phone]. Call abnormal results to [phone]. Other Instructions Refer to recovering at home, page 41, of your patient handbook for more instructions. Discharge Orders None Introducing [facility]! Danial Hebert introduces [employer] patient portal. Now you can access parts of your medical record, email your doctor's office, and request medication refills online. 1. In your internet browser, go to [email or web address] 2. Click on the First Time User? Click Here link in the Sign In box. You will see the New Member Sign Up page. 3. Enter your [employer] Access Code exactly as it appears below. You will not need to use this code after youve completed the sign-up process. If you do not sign up before the expiration date, you must request a new code. · [employer] Access Code: [email or web address] Expires: 5/29/2017  3:30 PM 
 
4. Enter the last four digits of your Social Security Number (xxxx) and Date of Birth (mm/dd/yyyy) as indicated and click Submit. You will be taken to the next sign-up page. 5. Create a [employer] ID. This will be your [employer] login ID and cannot be changed, so think of one that is secure and easy to remember. 6. Create a [employer] password. You can change your password at any time. 7. Enter your Password Reset Question and Answer. This can be used at a later time if you forget your password. 8. Enter your e-mail address. You will receive e-mail notification when new information is available in [address] 9. Click Sign Up. You can now view and download portions of your medical record. 10. Click the Download Summary menu link to download a portable copy of your medical information. If you have questions, please visit the Frequently Asked Questions section of the [employer] website. Remember, [employer] is NOT to be used for urgent needs. For medical emergencies, dial 911. Now available from your iPhone and Android! General Information Please provide this summary of care documentation to your next provider. Patient Signature:  ____________________________________________________________ Date:  ____________________________________________________________  
  
Erlinda Arias Provider Signature:  ____________________________________________________________ Date:  ____________________________________________________________

## 2017-05-25 NOTE — PROGRESS NOTES
TRANSFER - IN REPORT:    Verbal report received from Ping(name) on Tyrone Sprain  being received from [facility]) for routine post - op      Report consisted of patients Situation, Background, Assessment and   Recommendations(SBAR). Information from the following report(s) SBAR, Kardex, Intake/Output, MAR and Recent Results was reviewed with the receiving nurse. Opportunity for questions and clarification was provided. Assessment completed upon patients arrival to unit and care assumed.

## 2017-05-26 ENCOUNTER — APPOINTMENT (OUTPATIENT)
Dept: CT IMAGING | Age: 67
DRG: 470 | End: 2017-05-26
Attending: NURSE PRACTITIONER
Payer: COMMERCIAL

## 2017-05-26 LAB
ANION GAP BLD CALC-SCNC: 8 MMOL/L (ref 5–15)
ARTERIAL PATENCY WRIST A: YES
ATRIAL RATE: 89 BPM
BASE DEFICIT BLD-SCNC: 2 MMOL/L
BDY SITE: ABNORMAL
BUN SERPL-MCNC: 10 MG/DL (ref 6–20)
BUN/CREAT SERPL: 10 (ref 12–20)
CALCIUM SERPL-MCNC: 8.8 MG/DL (ref 8.5–10.1)
CALCULATED P AXIS, ECG09: 30 DEGREES
CALCULATED R AXIS, ECG10: 41 DEGREES
CALCULATED T AXIS, ECG11: -3 DEGREES
CHLORIDE SERPL-SCNC: 106 MMOL/L (ref 97–108)
CO2 SERPL-SCNC: 25 MMOL/L (ref 21–32)
CREAT SERPL-MCNC: 0.98 MG/DL (ref 0.55–1.02)
DIAGNOSIS, 93000: NORMAL
GAS FLOW.O2 O2 DELIVERY SYS: ABNORMAL L/MIN
GAS FLOW.O2 SETTING OXYMISER: 4 L/M
GLUCOSE BLD STRIP.AUTO-MCNC: 118 MG/DL (ref 65–100)
GLUCOSE BLD STRIP.AUTO-MCNC: 144 MG/DL (ref 65–100)
GLUCOSE BLD STRIP.AUTO-MCNC: 146 MG/DL (ref 65–100)
GLUCOSE BLD STRIP.AUTO-MCNC: 164 MG/DL (ref 65–100)
GLUCOSE SERPL-MCNC: 183 MG/DL (ref 65–100)
HCO3 BLD-SCNC: 22.6 MMOL/L (ref 22–26)
HGB BLD-MCNC: 11 G/DL (ref 11.5–16)
INR PPP: 1.1 (ref 0.9–1.1)
LACTATE SERPL-SCNC: 1.9 MMOL/L (ref 0.4–2)
O2/TOTAL GAS SETTING VFR VENT: 36 %
P-R INTERVAL, ECG05: 134 MS
PCO2 BLD: 37.1 MMHG (ref 35–45)
PH BLD: 7.39 [PH] (ref 7.35–7.45)
PO2 BLD: 98 MMHG (ref 80–100)
POTASSIUM SERPL-SCNC: 3.5 MMOL/L (ref 3.5–5.1)
PROTHROMBIN TIME: 10.8 SEC (ref 9–11.1)
Q-T INTERVAL, ECG07: 374 MS
QRS DURATION, ECG06: 82 MS
QTC CALCULATION (BEZET), ECG08: 455 MS
SAO2 % BLD: 98 % (ref 92–97)
SERVICE CMNT-IMP: ABNORMAL
SODIUM SERPL-SCNC: 139 MMOL/L (ref 136–145)
SPECIMEN TYPE: ABNORMAL
VENTRICULAR RATE, ECG03: 89 BPM

## 2017-05-26 PROCEDURE — 36415 COLL VENOUS BLD VENIPUNCTURE: CPT | Performed by: ORTHOPAEDIC SURGERY

## 2017-05-26 PROCEDURE — 36600 WITHDRAWAL OF ARTERIAL BLOOD: CPT

## 2017-05-26 PROCEDURE — 74011250636 HC RX REV CODE- 250/636: Performed by: NURSE PRACTITIONER

## 2017-05-26 PROCEDURE — 85018 HEMOGLOBIN: CPT | Performed by: ORTHOPAEDIC SURGERY

## 2017-05-26 PROCEDURE — 85610 PROTHROMBIN TIME: CPT | Performed by: ORTHOPAEDIC SURGERY

## 2017-05-26 PROCEDURE — 71275 CT ANGIOGRAPHY CHEST: CPT

## 2017-05-26 PROCEDURE — 97116 GAIT TRAINING THERAPY: CPT

## 2017-05-26 PROCEDURE — 74011250636 HC RX REV CODE- 250/636: Performed by: ORTHOPAEDIC SURGERY

## 2017-05-26 PROCEDURE — 74011000258 HC RX REV CODE- 258: Performed by: ORTHOPAEDIC SURGERY

## 2017-05-26 PROCEDURE — 74011636320 HC RX REV CODE- 636/320: Performed by: ORTHOPAEDIC SURGERY

## 2017-05-26 PROCEDURE — 82962 GLUCOSE BLOOD TEST: CPT

## 2017-05-26 PROCEDURE — 74011250637 HC RX REV CODE- 250/637: Performed by: ORTHOPAEDIC SURGERY

## 2017-05-26 PROCEDURE — 77030018846 HC SOL IRR STRL H20 ICUM -A

## 2017-05-26 PROCEDURE — 82803 BLOOD GASES ANY COMBINATION: CPT

## 2017-05-26 PROCEDURE — 65660000000 HC RM CCU STEPDOWN

## 2017-05-26 PROCEDURE — 80048 BASIC METABOLIC PNL TOTAL CA: CPT | Performed by: ORTHOPAEDIC SURGERY

## 2017-05-26 PROCEDURE — 74011250637 HC RX REV CODE- 250/637: Performed by: NURSE PRACTITIONER

## 2017-05-26 PROCEDURE — 83605 ASSAY OF LACTIC ACID: CPT | Performed by: INTERNAL MEDICINE

## 2017-05-26 RX ORDER — OXYCODONE HYDROCHLORIDE 5 MG/1
10 TABLET ORAL
Qty: 60 TAB | Refills: 0 | Status: SHIPPED | OUTPATIENT
Start: 2017-05-26

## 2017-05-26 RX ORDER — SODIUM CHLORIDE 0.9 % (FLUSH) 0.9 %
10 SYRINGE (ML) INJECTION
Status: COMPLETED | OUTPATIENT
Start: 2017-05-26 | End: 2017-05-26

## 2017-05-26 RX ORDER — WARFARIN 7.5 MG/1
7.5 TABLET ORAL ONCE
Status: COMPLETED | OUTPATIENT
Start: 2017-05-26 | End: 2017-05-26

## 2017-05-26 RX ORDER — WARFARIN 2.5 MG/1
2.5 TABLET ORAL DAILY
Qty: 60 TAB | Refills: 3 | Status: SHIPPED | OUTPATIENT
Start: 2017-05-26

## 2017-05-26 RX ADMIN — ACETAMINOPHEN 500 MG: 500 TABLET, FILM COATED ORAL at 10:29

## 2017-05-26 RX ADMIN — AMLODIPINE BESYLATE 5 MG: 5 TABLET ORAL at 21:20

## 2017-05-26 RX ADMIN — SODIUM CHLORIDE, POTASSIUM CHLORIDE, SODIUM LACTATE AND CALCIUM CHLORIDE 500 ML: 600; 310; 30; 20 INJECTION, SOLUTION INTRAVENOUS at 02:45

## 2017-05-26 RX ADMIN — ACETAMINOPHEN 500 MG: 500 TABLET, FILM COATED ORAL at 16:21

## 2017-05-26 RX ADMIN — PANTOPRAZOLE SODIUM 40 MG: 40 TABLET, DELAYED RELEASE ORAL at 06:45

## 2017-05-26 RX ADMIN — Medication 10 ML: at 01:44

## 2017-05-26 RX ADMIN — SENNOSIDES AND DOCUSATE SODIUM 1 TABLET: 8.6; 5 TABLET ORAL at 18:17

## 2017-05-26 RX ADMIN — DULOXETINE HYDROCHLORIDE 60 MG: 60 CAPSULE, DELAYED RELEASE ORAL at 10:19

## 2017-05-26 RX ADMIN — OXYCODONE HYDROCHLORIDE 5 MG: 5 TABLET ORAL at 16:22

## 2017-05-26 RX ADMIN — KETOROLAC TROMETHAMINE 15 MG: 30 INJECTION, SOLUTION INTRAMUSCULAR at 06:46

## 2017-05-26 RX ADMIN — ACETAMINOPHEN 500 MG: 500 TABLET, FILM COATED ORAL at 23:16

## 2017-05-26 RX ADMIN — OXYCODONE HYDROCHLORIDE 5 MG: 5 TABLET ORAL at 06:46

## 2017-05-26 RX ADMIN — LOSARTAN POTASSIUM 100 MG: 50 TABLET ORAL at 10:18

## 2017-05-26 RX ADMIN — Medication 10 ML: at 21:22

## 2017-05-26 RX ADMIN — OXYCODONE HYDROCHLORIDE 5 MG: 5 TABLET ORAL at 10:19

## 2017-05-26 RX ADMIN — IOPAMIDOL 80 ML: 755 INJECTION, SOLUTION INTRAVENOUS at 01:44

## 2017-05-26 RX ADMIN — OXYCODONE HYDROCHLORIDE 10 MG: 5 TABLET ORAL at 20:03

## 2017-05-26 RX ADMIN — CEVIMELINE HYDROCHLORIDE 30 MG: 30 CAPSULE ORAL at 07:25

## 2017-05-26 RX ADMIN — ACETAMINOPHEN 500 MG: 500 TABLET, FILM COATED ORAL at 20:03

## 2017-05-26 RX ADMIN — Medication 5 ML: at 14:00

## 2017-05-26 RX ADMIN — CEVIMELINE HYDROCHLORIDE 30 MG: 30 CAPSULE ORAL at 21:21

## 2017-05-26 RX ADMIN — ACETAMINOPHEN 500 MG: 500 TABLET, FILM COATED ORAL at 06:45

## 2017-05-26 RX ADMIN — CEFAZOLIN 2 G: 1 INJECTION, POWDER, FOR SOLUTION INTRAMUSCULAR; INTRAVENOUS; PARENTERAL at 00:28

## 2017-05-26 RX ADMIN — WARFARIN SODIUM 7.5 MG: 7.5 TABLET ORAL at 13:14

## 2017-05-26 RX ADMIN — SODIUM CHLORIDE 100 ML: 900 INJECTION, SOLUTION INTRAVENOUS at 01:44

## 2017-05-26 RX ADMIN — SENNOSIDES AND DOCUSATE SODIUM 1 TABLET: 8.6; 5 TABLET ORAL at 10:19

## 2017-05-26 RX ADMIN — POLYETHYLENE GLYCOL 3350 17 G: 17 POWDER, FOR SOLUTION ORAL at 10:19

## 2017-05-26 RX ADMIN — KETOROLAC TROMETHAMINE 15 MG: 30 INJECTION, SOLUTION INTRAMUSCULAR at 12:11

## 2017-05-26 RX ADMIN — OXYCODONE HYDROCHLORIDE 5 MG: 5 TABLET ORAL at 13:14

## 2017-05-26 NOTE — PROGRESS NOTES
Bedside and Verbal shift change report given to Nanci La RN (oncoming nurse) by Gianluca Morris RN (offgoing nurse). Report included the following information SBAR, Kardex and MAR.

## 2017-05-26 NOTE — ROUTINE PROCESS
2326: Paged hospitalist.    [phone]: 2nd page to hospitalist.      0000: Tawana Alert notified of pt lab results: lactic acid 3.9 & potassium 3.4.     0002: Paged ortho on call for hospitalist consult. 0015: Dr. Jeni Hill (ortho on call) notified of pt having a rapid response and code blue called d/t low bp & having trouble breathing.  Verbal order given to consult hospitalist.

## 2017-05-26 NOTE — CDMP QUERY
Dear Dr. Jc Barron,    Please clarify if this patient is being treated/managed for:    =>Lactic acidosis in the setting of elevated lactic acid requiring lab monitoring and IVF resuscitation  =>Other Explanation of clinical findings  =>Unable to Determine (no explanation of clinical findings)    The medical record reflects the following clinical findings, treatment, and risk factors:    Risk Factors: 65yo s/p knee arthroplasty  Clinical Indicators: per PN\"elevated lactic acid\", LA 3.9  Treatment: lab monitoring, IVF bolus and continuous infusion    Please clarify and document your clinical opinion in the progress notes and discharge summary including the definitive and/or presumptive diagnosis, (suspected or probable), related to the above clinical findings. Please include clinical findings supporting your diagnosis.     Thank You  Madison Stewart,MSN,BSN,RN,[facility]  [phone]

## 2017-05-26 NOTE — PROGRESS NOTES
Problem: Discharge Planning  Goal: *Discharge to safe environment  Outcome: Progressing Towards Goal  Discharge plan is to discharge home in care of family, home health, and self

## 2017-05-26 NOTE — PROGRESS NOTES
Pharmacist Note  Warfarin Dosing  Consult provided for this 73 y.o. female to manage warfarin for VTE prophylaxis s/p orthopedic surgery    INR Goal: 1.7- 2.2    Therapy Day: 2    Preop Dose: Génesis Aguilera- none    Drugs that may increase INR: None  Drugs that may decrease INR: None  Other current anticoagulants/ drugs that may increase bleeding risk: NSAID x 4 doses  Risk factors: Age > 65  Daily INR ordered: YES    Recent Labs      05/26/17   0056  05/25/17   2241   HGB  11.0*  10.6*   INR  1.1   --        Date               INR                 Dose  5/22  1.0  -  5/25                 --                    7.5 mg  5/26                 1.1                 7.5 mg    Assessment/ Plan: Will order warfarin 7.5 mg PO x 1 dose. Pharmacy will continue to monitor daily and adjust therapy as indicated.

## 2017-05-26 NOTE — PROGRESS NOTES
Bedside and Verbal shift change report given to [facility] (oncoming nurse) by Cortney Cunningham (offgoing nurse). Report included the following information SBAR, Kardex, Intake/Output, MAR and Recent Results.

## 2017-05-26 NOTE — PROGRESS NOTES
Hospitalist team following patient who is s/p Rt total knee arthroplasty and had an episode of respiratory distress and hypotension early morning today. IV fluid was administered. Patient has responded well. He respiratory distress has resolved. Ptaint is clinically stable. I personally have evaluated patient.   P/E  Lungs ctab  Heart s1s2 nl  A/P  Will d/c iv fluid as patient has adequate po intake

## 2017-05-26 NOTE — PROGRESS NOTES
Problem: Mobility Impaired (Adult and Pediatric)  Goal: *Acute Goals and Plan of Care (Insert Text)  Physical Therapy Goals  Initiated 5/25/2017    1. Patient will move from supine to sit and sit to supine in bed with independence within 4 days. 2. Patient will perform sit to stand with modified independence within 4 days. 3. Patient will ambulate with modified independence for 150 feet with the least restrictive device within 4 days. 4. Patient will ascend/descend 6 stairs with 2 handrail(s) with supervision/set-up within 4 days. 5. Patient will perform home exercise program per protocol with modified independence within 4 days. 6. Patient will demonstrate AROM 0-90 degrees in operative joint within 4 days. PHYSICAL THERAPY TREATMENT  Patient: Obi Hamilton (43 y.o. female)  Date: 5/26/2017  Diagnosis: OA LEFT KNEE   OA (osteoarthritis) of knee Osteoarthritis of right knee  Procedure(s) (LRB):  RIGHT TOTAL KNEE ARTHROPLASTY   (Right) 1 Day Post-Op  Precautions:  Falls, WBAT      ASSESSMENT:  Patient received supine in bed and agreeable to therapy. Patient tolerated session well, BP stable and patient declined dizziness during mobility. Patient completed supine<>sit with CGA and additional time. Patient performed sit<>stand with RW with CGA. Patient ambulated increased gait distance x 100 feet with RW via step through gait pattern, no LOB, decreased brisa. Patient reported increased pain RLE during activity and requested to return to supine position. RN made aware of patient's c/o pain and is due for pain medication soon. Applied ice for comfort. Patient will continue to benefit from PT to progress mobility as tolerated and reach highest level of independence. D/C rec: home with HHPT.    Progression toward goals:  [ ]      Improving appropriately and progressing toward goals  [X]      Improving slowly and progressing toward goals  [ ]      Not making progress toward goals and plan of care will be adjusted PLAN:  Patient continues to benefit from skilled intervention to address the above impairments. Continue treatment per established plan of care. Discharge Recommendations:  Home Health  Further Equipment Recommendations for Discharge:  Pt owns RW       SUBJECTIVE:   \"I feel better after I have eaten. \"      OBJECTIVE DATA SUMMARY:         Functional Mobility Training:  Bed Mobility:     Supine to Sit: Contact guard assistance  Sit to Supine: Contact guard assistance           Transfers:  Sit to Stand: Contact guard assistance  Stand to Sit: Contact guard assistance        Ambulation/Gait Training:  Distance (ft): 100 Feet (ft)  Assistive Device: Gait belt;Walker, rolling  Ambulation - Level of Assistance: Contact guard assistance;Minimal assistance        Gait Abnormalities: Decreased step clearance; Step to gait (progressed to step through)  Right Side Weight Bearing: As tolerated     Base of Support: Widened  Stance: Right decreased  Speed/Caroline: Pace decreased (<100 feet/min)  Step Length: Right shortened;Left shortened     Therapeutic Exercises:   Exercises performed per protocol. See morning treatment note for description. Pain:  Pain Scale 1: Numeric (0 - 10)  Pain Intensity 1: 2           Pain Intervention(s) 1: Medication (see MAR)  Activity Tolerance:   Improving. BP stable. Patient denied dizziness during upright activity and ambulation  Please refer to the flowsheet for vital signs taken during this treatment.   After treatment:   [ ] Patient left in no apparent distress sitting up in chair  [X] Patient left in no apparent distress in bed  [X] Call bell left within reach  [X] Nursing notified  [ ] Caregiver present  [ ] Bed alarm activated      COMMUNICATION/COLLABORATION:   The patients plan of care was discussed with: Registered Nurse     Dean Goldberg, PT, DPT   Time Calculation: 16 mins

## 2017-05-26 NOTE — PROGRESS NOTES
ATSP Secondary to CODE BLUE/ RRT for respiratory distress  Blood pressure 126/64, pulse 88, temperature 97.5 °F (36.4 °C), resp. rate 16, height 5' 7.5\" (1.715 m), weight 91.2 kg (201 lb), SpO2 98 %. Patient is a 73 y.o. female presented with a history of right knee pain. Onset of symptoms was gradual with gradually worsening course since that time. She is being admitted for surgical management of this condition s/p  RTKA on 5/25. This patient had an abrupt report of inability to catch her breath. She was found gasping for air with decrease in SBP initially an RRT was activated followed by  Code blue. Patient without loss of  Consciousness, spontaneous pulse or respirations. Code blue cancelled.   Guzman Reynaga NP

## 2017-05-26 NOTE — PROGRESS NOTES
Problem: Mobility Impaired (Adult and Pediatric)  Goal: *Acute Goals and Plan of Care (Insert Text)  Physical Therapy Goals  Initiated 5/25/2017    1. Patient will move from supine to sit and sit to supine in bed with independence within 4 days. 2. Patient will perform sit to stand with modified independence within 4 days. 3. Patient will ambulate with modified independence for 150 feet with the least restrictive device within 4 days. 4. Patient will ascend/descend 6 stairs with 2 handrail(s) with supervision/set-up within 4 days. 5. Patient will perform home exercise program per protocol with modified independence within 4 days. 6. Patient will demonstrate AROM 0-90 degrees in operative joint within 4 days. PHYSICAL THERAPY TREATMENT  Patient: Nahid Gonzalez (88 y.o. female)  Date: 5/26/2017  Diagnosis: OA LEFT KNEE   OA (osteoarthritis) of knee Osteoarthritis of right knee  Procedure(s) (LRB):  RIGHT TOTAL KNEE ARTHROPLASTY   (Right) 1 Day Post-Op  Precautions:  Falls, WBAT RLE      ASSESSMENT:  Patient received seated in chair and agreeable to therapy. Patient reported she had been sitting up since 7am. Patient BP in sitting position: 160/84. Patient completed sit<>stand with RW with CGA and additional time. During static standing x 2 minutes with RW with CGA, BP: 166/80 and asymptomatic. Patient ambulated 15 feet with RW with CGA-min assist and reported feeling dizzy. Patient able to ambulate 15 feet back to room and sat edge of bed and returned to supine position with CGA. Patient BP once supine position: 157/82. Patient reported she felt that if she walked further she was afraid she would have passed out. Patient tolerated session fairly well, however limited by reports of dizziness during upright activity; BP stable though. Patient will continue to benefit from PT to progress mobility as tolerated and reach highest level of independence.  D/C rec: home with HHPT once patient has progressed with therapy. Progression toward goals:  [ ]      Improving appropriately and progressing toward goals  [X]      Improving slowly and progressing toward goals  [ ]      Not making progress toward goals and plan of care will be adjusted       PLAN:  Patient continues to benefit from skilled intervention to address the above impairments. Continue treatment per established plan of care. Discharge Recommendations:  Home Health  Further Equipment Recommendations for Discharge:  Pt owns RW       SUBJECTIVE:   Patient stated I am going to get through this. I have to.       OBJECTIVE DATA SUMMARY:   Critical Behavior:  Neurologic State: Alert  Orientation Level: Oriented X4  Cognition: Appropriate decision making, Appropriate safety awareness, Appropriate for age attention/concentration, Follows commands        Functional Mobility Training:  Bed Mobility:        Sit to Supine: Contact guard assistance           Transfers:  Sit to Stand: Contact guard assistance  Stand to Sit: Contact guard assistance  Balance:  Sitting: Intact  Standing: Intact; With support  Ambulation/Gait Training:  Distance (ft): 30 Feet (ft)  Assistive Device: Gait belt;Walker, rolling  Ambulation - Level of Assistance: Contact guard assistance;Minimal assistance        Gait Abnormalities: Decreased step clearance; Step to gait (progressed to step through)  Right Side Weight Bearing: As tolerated     Base of Support: Widened  Stance: Right decreased  Speed/Caroline: Pace decreased (<100 feet/min)  Step Length: Right shortened;Left shortened        Therapeutic Exercises:     EXERCISE   Sets   Reps   Active Active Assist   Passive Self ROM   Comments   Ankle Pumps   10 [X]                                        [ ]                                        [ ]                                        [ ]                                            Quad Sets   10 [X]                                        [ ]                                        [ ] [ ]                                            Hamstring Sets     [ ]                                        [ ]                                        [ ]                                        [ ]                                            Edna Edwin     [ ]                                        [ ]                                        [ ]                                        [ ]                                            Zack Robles       [ ]                                          [ ]                                          [ ]                                          [ ]                                            Heel Slides   8 [ ]                                        [X]                                        [ ]                                        [ ]                                               Pain:  Pain Scale 1: Numeric (0 - 10)  Pain Intensity 1: 2           Pain Intervention(s) 1: Medication (see MAR)  Activity Tolerance:   Fair. BP stable, limited by reports of dizziness  Please refer to the flowsheet for vital signs taken during this treatment.   After treatment:   [ ] Patient left in no apparent distress sitting up in chair  [X] Patient left in no apparent distress in bed  [X] Call bell left within reach  [X] Nursing notified  [ ] Caregiver present  [ ] Bed alarm activated      COMMUNICATION/COLLABORATION:   The patients plan of care was discussed with: Registered Nurse     Toya Munoz, PT, DPT   Time Calculation: 19 mins

## 2017-05-26 NOTE — CONSULTS
[address]   [address], [address] [place] [address] Ave   [address] [facility]       Name:  Christina Ashraf   MR#:  [id number]   :  1950   Account #:  [de-identified]    Date of Consultation:  2017   Date of Adm:  2017       PRIMARY CARE DOCTOR: Pam Ren MD    ATTENDING PHYSICIAN Loren Lazcano MD    REQUESTING PHYSICIAN: Loren Lazcano MD    REASON FOR CONSULT: Low blood pressure and difficulty breathing   status post Rapid Response. SUBJECTIVE: The patient is a 81-year-old female with history of   osteoarthritis and obstructive sleep apnea, status post total right knee   replacement 2017 pain. According to the report, the   patient started having acute respiratory distress 30-40 minutes after   DuoNeb nebulizer treatment. The patient was said to have complained   that she was unable to breathe. At that time, blood pressure was said   to be 89/52, pulse of 86. The patient was not febrile. Respiratory rate   was said to be 21. Rapid Response was called and shortly thereafter,   a CODE BLUE was called, but a nurse practitioner was very close by   and she called out a CODE BLUE because the patient was awake,   alert, oriented and giving history. ABG showed a pH of 7.5 and   pCO2 of 30, oxygen was 240. The patient was noted to be   hyperventilating. She has a history of obstructive sleep apnea on   CPAP. The patient was not tachycardic or tachypneic as per hospital   report. When I saw the patient, she was alert and oriented x4. The   patient is thinking that the DuoNeb nebulizer treatment is the cause of   her problem, as she reported that she was back to baseline. Blood   pressure when I was seeing the patient was 126/64. Lactic acid was   noted to be above 3 and potassium was 3.4. I ordered and reviewed all the labs and we   obtained a CTA of the chest, which was unremarkable. PAST MEDICAL HISTORY    1. Sever osteoarthritis of bilateral knees.  The patient has had left total   knee replacement in 2014.   2. Brain aneurysm. 3. Fibromyalgia. 4. Chronic obstructive pulmonary disease. 5. Gastroesophageal reflux disease. 6. Hypertension. 7. Obstructive sleep apnea on CPAP. PAST SURGICAL HISTORY    1. Colonoscopy. 2. Hysterectomy. 3. Left knee replacement, 2014. 4. Right broken ankle, status post ORIF.   5. Tubal ligation. 6. Brain aneurysm, status post clipping. HOME MEDICATIONS    1. Omeprazole 40 mg daily. 2. Tiotropium 1 inhalation daily. 3. Oxycodone/acetaminophen (Percocet) 10/325 one tablet 2 times   daily. 4. Losartan 100 mg daily. 5. Multivitamin 1 tablet daily. 6. Duloxetine 60 mg daily. 7. Amlodipine 5 mg every night due. 8. Buprenorphine (Butrans) 10 mcg transdermal every week. ALLERGIES: MORPHINE. SOCIAL HISTORY: The patient is a former smoker. She quit smoking   in 1990. She denies alcohol or recreational drug use. FAMILY HISTORY: Significant for osteoarthritis, hypertension and   heart disease, status post pacemaker in her mother. Her father had   history of osteoarthritis and alcohol abuse. Sister has   osteoarthritis. Brother has history of osteoarthritis as well. REVIEW OF SYSTEMS   More than 12 systems reviewed, and the pertinent positives are in   history of present illness. All others are negative. PHYSICAL EXAMINATION   GENERAL: The patient is seen lying in bed in no acute respiratory   distress. She is on oxygen via nasal cannula. VITAL SIGNS: Blood pressures during the episode was 89/52,   currently blood pressure is 126/64; pulse was 86, currently is 88;   respiration 21 during the episode, currently 16; temperature 97.5; O2   saturation 98%. HEAD, EARS, NOSE AND THROAT: Atraumatic, normocephalic,   anicteric, not pale, not jaundiced. Extraocular muscles intact. Pupils   bilaterally reactive, equal, round. NECK: Supple, no JVD, no carotid bruit. HEART: Sounds 1 and 2, regular rate and rhythm. No gallop, no   friction, no rub. RESPIRATION: Good air entry bilaterally. Clear to auscultation. ABDOMEN: Soft, nontender. Bowel sounds normoactive. NEUROLOGIC: Alert, oriented x3. Cranial nerves 2-12 intact. SKIN: Warm, dry, normal skin color, normal skin turgor. PSYCHIATRIC: Normal mood, normal affect. BACK: Normal range of motion, no CVA tenderness. EXTREMITIES: No edema, no cyanosis. Right knee dressing status   post total knee replacement. The patient is on bilateral SCDs. DIAGNOSTIC TESTS: WBC 10.7, hemoglobin 10.6, hematocrit 33.6,   platelets 906. INR 1.1. Sodium 139, potassium 3.5, chloride 106,   carbon dioxide 25, glucose 103, BUN 10, creatinine 0.98. ABG shows   pH of 7.517, pCO2 30, pO2 of 240, O2 saturation 100%. CT of the   chest shows no acute pathology. ASSESSMENT    1. Respiratory distress. 2. Hyperventilation. 3. Low blood pressure, resolved. 4. Elevated lactic acid. 5. Severe osteoarthritis of the right knee, status post total knee   replacement. 6. History of left total knee replacement due to severe degenerative   joint disease. 7. Fibromyalgia. 8. Osteoarthritis. 9. Brain aneurysm, status post clipping and coiling. 10. Chronic obstructive pulmonary disease. 11. Chronic pain syndrome. 12. Fibromyalgia. 13. Gastroesophageal reflux disease. 14. Hypertension. 15. Sjogren's syndrome. 16. Obstructive sleep apnea. PLAN: Postoperative management as per attending physician. We will   bolus the patient 500 mL of normal saline. Status   post bolus the patient will be continued on maintenance fluids. We obtained CBC and BMP. Reviewed the results as   above. ABG was also obtained and reviewed as above. We will repeat   ABG status post fluid bolus. CTA was obtained and reviewed as   above. Review of medical records showed that the patient has not   been restarted on her tiotropium. We will restart the patient on   tiotropium.  DuoNeb will be every 6 hours p.r.n. for shortness of   breath. We will start CPAP for obstructive sleep apnea during   sleep. Oxygen supplementation as needed, low flow oxygen for O2   saturation around 92%-93%. We will repeat lactic acid status post   IV fluid bolus. The patient is not febrile and there is no source of   infection. Monitor vital signs, including blood pressure as per unit   protocol. Extensive patient education was done by bedside. DVT   prophylaxis as per attending physician. The patient is on bilateral   SCDs. I discussed advance directive with the patient. SHE IS FULL   CODE. Further management will depend on the patient's clinical   progression and further evaluation by attending physician. Results of   tests and recommendations by consulting hospitalists. The   hospitalist team will continue to follow the patient on consult. Thank you for involving the hospitalist team in taking care of this very   nice patient.         MD AMI Lyle / BENNY   D:  05/26/2017   04:18   T:  05/26/2017   06:13   Job #:  [id number]

## 2017-05-26 NOTE — CDMP QUERY
Query #2    Dear Dr. Roxann Mcnair,    Patient is noted to have a BMI of 31.02.  Please clarify if this patient is:     =>Morbidly obese  =>Obese   =>Overweight  =>Other Explanation of clinical findings  =>Unable to Determine (no explanation of clinical findings)    The medical record reflects the following clinical findings, treatment, and risk factors:    Presentation-BMI 31.02, Ht: 5' 7.5\" (1.715 m),  Wt: 91.2 kg (201 lb)      REFERENCE:  The [address] [place] [address] has issued a statement indicating that, \"Individuals who are overweight, obese, or morbidly obese are at an increased risk for certain medical conditions when compared to persons of normal weight. Therefore, these conditions are always clinically significant and reportable when documented by the provider. Please clarify and document your clinical opinion in the progress notes and discharge summary including the definitive and/or presumptive diagnosis, (suspected or probable), related to the above clinical findings. Please include clinical findings supporting your diagnosis.     Thank You  Madison Stewart,MSN,BSN,RN,[facility]  [phone]

## 2017-05-26 NOTE — PROGRESS NOTES
POD 1 Day Post-Op    Procedure:  Procedure(s):  RIGHT TOTAL KNEE ARTHROPLASTY      Subjective:     Patient complains of mild right knee pain. She denies shortness of breath at this moment. Objective:     Blood pressure (!) 156/93, pulse 74, temperature 98.4 °F (36.9 °C), resp. rate 14, height 5' 7.5\" (1.715 m), weight 91.2 kg (201 lb), SpO2 98 %. Temp (24hrs), Av.7 °F (36.5 °C), Min:97.1 °F (36.2 °C), Max:98.4 °F (36.9 °C)      Physical Exam:  Patient is awake, alert and oriented x 3. Examination of the right knee reveals that the dressings are clean, dry and in place. Sensation is intact to light touch. Brisk capillary refill. Palpable distal pulses. Labs:   Lab Results   Component Value Date/Time    HGB 11.0 2017 12:56 AM         Assessment:     Principal Problem:    Osteoarthritis of right knee (2017)    Patient's shortness of breath seems to have resolved. Her blood pressure is also back to normal levels after a hypotensive episode yesterday.     Plan/Recommendations/Medical Decision Making:     Continue physical therapy  Pain control  DVT prophylaxis  Ice and elevate  Begin discharge planning

## 2017-05-26 NOTE — PROGRESS NOTES
CM reviewed chart. CM noted pt had right total knee arthroplasty with history of aneurysm, arthritis, COPD, chronic pain, GERD, HTN, sjogrens, and sl eep apnea. CM met with pt to introduce self and role and offer support. Bedside assessment completed. CURRENT LIVING SITUATION-  Pt states she lives with her son and his family. Pt was driving prior to this hospital stay and has assistance with transportation as needed. Pt was using a walker and CPAP at home and was independent with ADL's and IADL's. Pt's PCP is Dr Dave Marr phone # [phone]. Pt ;ast saw her PCP 2 weeks ago. Pt gets her prescriptions filled at [facility].  CM verified with pt her insurance is [employer]. Pt does have an AMD.      DISCHARGE PLANNING-  Pt denies need for assistance with medications, transportation, and follow up appointments. Disposition plan is to discharge home in care family, home health, and self. CM offered choice of HH and pt selected [facility]. CM sent referral to [facility] via [employer]. [facility] accepted pt for [facility]. Information added to AVS.  Lupe Gonzalez RN CRM    Care Management Interventions  PCP Verified by CM:  Yes (Dr Dave Marr phone # [phone])  Palliative Care Consult (Criteria: CHF and RRAT>21): No  Mode of Transport at Discharge: Self (private car)  Transition of Care Consult (CM Consult): [address]: No  Reason Outside [place]: Physician referred to specific agency ([facility])  MyChart Signup: No  Physical Therapy Consult: Yes  Occupational Therapy Consult: No  Speech Therapy Consult: No  Current Support Network: Relative's Home (Lives with son and family)  Confirm Follow Up Transport: Family (private car)  Plan discussed with Pt/Family/Caregiver: Yes  Freedom of Choice Offered: Yes  Discharge Location  Discharge Placement: Home with home health

## 2017-05-27 VITALS
BODY MASS INDEX: 30.46 KG/M2 | WEIGHT: 201 LBS | HEIGHT: 68 IN | OXYGEN SATURATION: 97 % | DIASTOLIC BLOOD PRESSURE: 96 MMHG | TEMPERATURE: 97.8 F | RESPIRATION RATE: 16 BRPM | SYSTOLIC BLOOD PRESSURE: 170 MMHG | HEART RATE: 84 BPM

## 2017-05-27 LAB
GLUCOSE BLD STRIP.AUTO-MCNC: 131 MG/DL (ref 65–100)
GLUCOSE BLD STRIP.AUTO-MCNC: 160 MG/DL (ref 65–100)
HGB BLD-MCNC: 10.4 G/DL (ref 11.5–16)
INR PPP: 1.5 (ref 0.9–1.1)
PROTHROMBIN TIME: 15.1 SEC (ref 9–11.1)
SERVICE CMNT-IMP: ABNORMAL
SERVICE CMNT-IMP: ABNORMAL

## 2017-05-27 PROCEDURE — 97110 THERAPEUTIC EXERCISES: CPT

## 2017-05-27 PROCEDURE — 74011250637 HC RX REV CODE- 250/637: Performed by: NURSE PRACTITIONER

## 2017-05-27 PROCEDURE — 85018 HEMOGLOBIN: CPT | Performed by: ORTHOPAEDIC SURGERY

## 2017-05-27 PROCEDURE — 97116 GAIT TRAINING THERAPY: CPT

## 2017-05-27 PROCEDURE — 36415 COLL VENOUS BLD VENIPUNCTURE: CPT | Performed by: ORTHOPAEDIC SURGERY

## 2017-05-27 PROCEDURE — 82962 GLUCOSE BLOOD TEST: CPT

## 2017-05-27 PROCEDURE — 74011250637 HC RX REV CODE- 250/637: Performed by: ORTHOPAEDIC SURGERY

## 2017-05-27 PROCEDURE — 85610 PROTHROMBIN TIME: CPT | Performed by: ORTHOPAEDIC SURGERY

## 2017-05-27 RX ORDER — WARFARIN 2.5 MG/1
2.5 TABLET ORAL ONCE
Status: COMPLETED | OUTPATIENT
Start: 2017-05-27 | End: 2017-05-27

## 2017-05-27 RX ADMIN — OXYCODONE HYDROCHLORIDE 5 MG: 5 TABLET ORAL at 12:53

## 2017-05-27 RX ADMIN — THERA TABS 1 TABLET: TAB at 08:43

## 2017-05-27 RX ADMIN — CEVIMELINE HYDROCHLORIDE 30 MG: 30 CAPSULE ORAL at 07:26

## 2017-05-27 RX ADMIN — LOSARTAN POTASSIUM 100 MG: 50 TABLET ORAL at 08:43

## 2017-05-27 RX ADMIN — WARFARIN SODIUM 2.5 MG: 2.5 TABLET ORAL at 12:53

## 2017-05-27 RX ADMIN — ACETAMINOPHEN 500 MG: 500 TABLET, FILM COATED ORAL at 12:53

## 2017-05-27 RX ADMIN — DULOXETINE HYDROCHLORIDE 60 MG: 60 CAPSULE, DELAYED RELEASE ORAL at 08:43

## 2017-05-27 RX ADMIN — ACETAMINOPHEN 500 MG: 500 TABLET, FILM COATED ORAL at 06:53

## 2017-05-27 RX ADMIN — SENNOSIDES AND DOCUSATE SODIUM 1 TABLET: 8.6; 5 TABLET ORAL at 08:42

## 2017-05-27 RX ADMIN — Medication 10 ML: at 06:54

## 2017-05-27 RX ADMIN — POLYETHYLENE GLYCOL 3350 17 G: 17 POWDER, FOR SOLUTION ORAL at 08:42

## 2017-05-27 RX ADMIN — OXYCODONE HYDROCHLORIDE 5 MG: 5 TABLET ORAL at 08:43

## 2017-05-27 RX ADMIN — PANTOPRAZOLE SODIUM 40 MG: 40 TABLET, DELAYED RELEASE ORAL at 06:53

## 2017-05-27 NOTE — DISCHARGE SUMMARY
Discharge Summary      Patient ID:  Wally Meraz  [id number]  73 y.o.  1950    Admit date: 5/25/2017    Discharge date and time:      Admission Diagnoses: OA LEFT KNEE   OA (osteoarthritis) of knee    Discharge Diagnoses: Principal Problem:    Osteoarthritis of right knee (5/25/2017)        Surgeon: Kole Terry Course: Wally Meraz is a 73 y.o. female that was admitted on the above date and underwent Procedure(s):  RIGHT TOTAL KNEE ARTHROPLASTY   without complications on 0/60/5886. Post-operatively her hospital course was relatively unremarkable as she progressed well with physical therapy and was discharged on , with an appointment with Dr. Duran Ortiz. Please see the chart for further details of the patient's stay.        Signed:  Sarbjit Bro MD  5/27/2017  12:39 PM

## 2017-05-27 NOTE — PROGRESS NOTES
PT stated that pt blood pressure reached \"200/100\" while ambulating and pt complained of facial numbness. Pt returned to bed, RN at bedside. Pt states she is \"feeling better\" and \"just got a little anxious walking back\", no neuro deficits noted upon assessment. Dr. Mariam Cisneros notified, no new orders at this time. Dr. Mariam Cisneros also stated that pt is \"medically stable\" and clear for discharge from his standpoint.

## 2017-05-27 NOTE — DISCHARGE INSTRUCTIONS
[address] [facility]. Total Knee Replacement  Post-Op Discharge Instructions  Dr. Roosevelt Carrera a regular diet. Drink plenty of fluids. Eat foods high in fiber and protein and low in fat. Avoid alcoholic beverages. Avoid smoking. Activities  You are going home a well person, so be as active as possible. Walk with your walker or crutches. Continue using your walker or crutches until seen for your follow-up visit. Avoid low chairs and slippery surfaces. Avoid twisting your knee. Do not sit longer than 45 minutes at a time. During the daytime, get up every hour and take a brief walk. Exercises  Perform your exercise routine 3 times a day as instructed by the physical therapist.  Gradually increase the repetitions of the exercises. You may place an icebag on your knee for 5-10 minutes after exercising. Please place a cloth between the ice and skin. You should walk daily, each time lengthening your walking distance as your strength improves. Medications  Take Coumadin daily to prevent blood clots. Do not take aspirin or anti-inflammatory medicines while you are taking Coumadin. Take a multivitamin with iron once a day for a month. You may need to take a laxative or stool-softener if you experience constipation. You will be given a prescription for roxicodone for pain. Take as directed. Take pain medication with food. You will find that you will decrease the amount you use as your pain lessens. Incision Care  You will have Steri strips (tapes) on your knee incision. Do not remove them. They will come off on their own. You may take a shower when your incision is dry, generally about a week after surgery. It is OK to let the water run over your incision, but do not soak the knee in water. You should keep a dressing on your wound if there is any drainage; change daily. Wear your elastic stockings for 4 weeks.   You may remove them for approximately 1 hour when showering/sponge-bathing. Do not shower if the wound has drainage. Follow-up office visit  See Dr. Ernie Floyd in 2-3 weeks. Call to make an appointment:  [phone]. Reasons to call the doctor  1. Increased redness, swelling, or drainage from your incision site. 2.  Temperature consistently greater than 102 degrees. 3.  Increased pain or unrelieved pain. 4.  Calf or chest pain. Home Health  Physical therapy - routine exercises, transfers, gait training, active straight leg raises, 3 times a week for 3 weeks. Home nursing - draw a pro-time every Wednesday for 3 weeks. Fax the results to Dr. Ernie Floyd at [phone]. Call abnormal results to [phone]. Other Instructions  Refer to recovering at home, page 41, of your patient handbook for more instructions.

## 2017-05-27 NOTE — PROGRESS NOTES
Hospitalist Progress Note  Rod Lawrence MD  Internal medicine/ Hospitalist    Daily Progress Note: 5/27/2017 9:25 AM      Interval history / Subjective:   Patient is s/p rt total knee arthroplasty and had hypotension and respiratory distress after surgery. Hospitalist team was consulted for post-op con-management. She is feeling better today.     Current Facility-Administered Medications   Medication Dose Route Frequency    warfarin (COUMADIN) tablet 2.5 mg  2.5 mg Oral ONCE    tiotropium-olodaterol 2.5-2.5 mcg/actuation mist 2.5 mg  2.5 mg Inhalation DAILY    cevimeline (EVOXAC) capsule 30 mg  30 mg Oral BID    DULoxetine (CYMBALTA) capsule 60 mg  60 mg Oral DAILY    sodium chloride (NS) flush 5-10 mL  5-10 mL IntraVENous Q8H    sodium chloride (NS) flush 5-10 mL  5-10 mL IntraVENous PRN    naloxone (NARCAN) injection 0.4 mg  0.4 mg IntraVENous PRN    senna-docusate (PERICOLACE) 8.6-50 mg per tablet 1 Tab  1 Tab Oral BID    polyethylene glycol (MIRALAX) packet 17 g  17 g Oral DAILY    bisacodyl (DULCOLAX) suppository 10 mg  10 mg Rectal DAILY PRN    oxyCODONE IR (ROXICODONE) tablet 5 mg  5 mg Oral Q3H PRN    oxyCODONE IR (ROXICODONE) tablet 10 mg  10 mg Oral Q3H PRN    dexamethasone (DECADRON) 4 mg/mL injection 4 mg  4 mg IntraVENous Q6H PRN    hydrOXYzine HCl (ATARAX) tablet 10 mg  10 mg Oral Q8H PRN    Warfarin - pharmacy to dose   Other Rx Dosing/Monitoring    pantoprazole (PROTONIX) tablet 40 mg  40 mg Oral ACB    amLODIPine (NORVASC) tablet 5 mg  5 mg Oral QHS    losartan (COZAAR) tablet 100 mg  100 mg Oral DAILY    therapeutic multivitamin (THERAGRAN) tablet 1 Tab  1 Tab Oral DAILY    acetaminophen (TYLENOL) tablet 325-650 mg  325-650 mg Oral Q8H PRN    acetaminophen (TYLENOL) tablet 500 mg  500 mg Oral Q4HWA        Objective:     Visit Vitals    /85    Pulse 75    Temp 97.8 °F (36.6 °C)    Resp 16    Ht 5' 7.5\" (1.715 m)    Wt 91.2 kg (201 lb)    SpO2 97%    BMI 31.02 kg/m2    O2 Flow Rate (L/min): 2 l/min O2 Device: Room air    Temp (24hrs), Av.3 °F (36.8 °C), Min:97.8 °F (36.6 °C), Max:98.8 °F (37.1 °C)      701 - 1900  In: 480 [P.O.:480]  Out: -   1901 -  0700  In: 540 [P.O.:240; I.V.:300]  Out: 3350 [Urine:3350]  P/E  NAD,laying comfortably in bed. Lungs ctab  Heart s1s2 nl,no m/g  Abdm:soft,not tender. Extr:no edema. Data Review    Recent Results (from the past 12 hour(s))   GLUCOSE, POC    Collection Time: 17 10:06 PM   Result Value Ref Range    Glucose (POC) 146 (H) 65 - 100 mg/dL    Performed by [address] [place] [address] Road    Collection Time: 17  2:10 AM   Result Value Ref Range    HGB 10.4 (L) 11.5 - 16.0 g/dL   PROTHROMBIN TIME + INR    Collection Time: 17  2:10 AM   Result Value Ref Range    INR 1.5 (H) 0.9 - 1.1      Prothrombin time 15.1 (H) 9.0 - 11.1 sec   GLUCOSE, POC    Collection Time: 17  6:21 AM   Result Value Ref Range    Glucose (POC) 131 (H) 65 - 100 mg/dL    Performed by John Crane (S0N)          Assessment/Plan:     Principal Problem:    Acute respiratory distress post surgery    Hypotension    S/p rapid response. Osteoarthritis of right knee (2017)    Care Plan  1-S/p knee surgery:    -Ortho following patient,mobilization and further plan as per ortho. 2-Acute respiratory distress/Hypotension:    -Resolved. -Pt medically stable.

## 2017-05-27 NOTE — PROGRESS NOTES
Pt became orthostatic per PT during assessment, however pt was asymptomatic. Dr. Bao Flores notified, no new orders. Dr. Herman Jc stated \"tell pt to stand up slowly\".

## 2017-05-27 NOTE — PROGRESS NOTES
I have reviewed discharge instructions with the patient and caregiver. The patient and caregiver verbalized understanding. Copy of AVS and scripts sent with pt. IV removed per protocol, belongings returned. Dressing changed and new VARUN stockings applied. Educated on new medications, next medication administration times, safe ambulation, infection prevention, S&S of blood clots. No further questions at this time. Assisted to d/c by PCT.

## 2017-05-27 NOTE — PROGRESS NOTES
In response to code Catarino Jim; Patient is noted to have a BMI of 31. 02.  Please clarify if this patient is:     =>Obese       The  medical record reflects the following clinical findings, treatment, and risk factors:    Presentation-BMI 31.02, Ht: 5' 7.5\" (1.715 m),  Wt: 91.2 kg (201 lb)

## 2017-05-27 NOTE — PROGRESS NOTES
Problem: Mobility Impaired (Adult and Pediatric)  Goal: *Acute Goals and Plan of Care (Insert Text)  Physical Therapy Goals  Initiated 5/25/2017    1. Patient will move from supine to sit and sit to supine in bed with independence within 4 days. 2. Patient will perform sit to stand with modified independence within 4 days. 3. Patient will ambulate with modified independence for 150 feet with the least restrictive device within 4 days. 4. Patient will ascend/descend 6 stairs with 2 handrail(s) with supervision/set-up within 4 days. 5. Patient will perform home exercise program per protocol with modified independence within 4 days. 6. Patient will demonstrate AROM 0-90 degrees in operative joint within 4 days. PHYSICAL THERAPY TREATMENT  Patient: Elisa Peña (67 y.o. female)  Date: 5/27/2017  Diagnosis: OA LEFT KNEE   OA (osteoarthritis) of knee Osteoarthritis of right knee  Procedure(s) (LRB):  RIGHT TOTAL KNEE ARTHROPLASTY   (Right) 2 Days Post-Op  Precautions:        ASSESSMENT:  Patient presents in restroom and is agreeable to participation in physical therapy. Generally transfers CGA with RW and additional time. Patient demo sit>stand CGA, ambulated with a RW to bedside, stand > sit CGA. Patient demonstrated safe hand placement and good eccentric control with LQ. BP measures revealed hypertension. Patient transferred sitting>supine with CGA, use of LLQ to assist RLQ. BP measures remained hypertensive, and patient reported symptoms including generalized facial tingling which patient reported is a typical symptom, some lightheadedness. Nursing notified, and plan to check on patient in <5 minutes. Reviewed ankle pumps, heel slides. Patient reported ability to page nurse with call button. Patient left supine in bed in no apparent distress with call button within reach. Patient demonstrated steadiness and safety on feet and with transfers; ability with stairs not yet demonstrated.      Progression toward goals:  [ ]    Improving appropriately and progressing toward goals  [X]    Improving slowly and progressing toward goals  [ ]    Not making progress toward goals and plan of care will be adjusted       PLAN:  Patient continues to benefit from skilled intervention to address the above impairments. Continue treatment per established plan of care. Discharge Recommendations:  Home Health   Further Equipment Recommendations for Discharge:  rolling walker       SUBJECTIVE:   Patient stated I can't wait to go home.       OBJECTIVE DATA SUMMARY:   Critical Behavior:  Neurologic State: (P) Alert  Orientation Level: (P) Oriented X4  Cognition: (P) Follows commands     Functional Mobility Training:  Bed Mobility:        Sit to Supine: Contact guard assistance;Minimum assistance           Transfers:  Sit to Stand: Contact guard assistance  Stand to Sit: Contact guard assistance                             Balance:  Sitting: Intact  Standing: With support; Intact  Ambulation/Gait Training:  Distance (ft): 15 Feet (ft)  Assistive Device: Walker, rollator  Ambulation - Level of Assistance: Contact guard assistance;Assist x1;Additional time; Adaptive equipment        Gait Abnormalities: Antalgic           Stance: Right decreased  Speed/Caroline: Slow  Step Length: Right shortened           Stairs: not yet demonstrated        Therapeutic Exercises: Ankle pump x 10  Heel slides x 10     Pain:  Pain Scale 1: Numeric (0 - 10)  Pain Intensity 1: 4  Pain Location 1: Knee  Pain Orientation 1: Right  Pain Description 1: Aching  Pain Intervention(s) 1: Medication (see MAR)  Activity Tolerance:   Hypertension     Please refer to the flowsheet for vital signs taken during this treatment.   After treatment:   [ ]    Patient left in no apparent distress sitting up in chair  [X]    Patient left in no apparent distress in bed  [X]    Call bell left within reach  [X]    Nursing notified  [ ]    Caregiver present  [ ]    Bed alarm activated COMMUNICATION/COLLABORATION:   The patients plan of care was discussed with: Registered Nurse     Carlitos Hughes, PT   Time Calculation: 18 mins

## 2017-05-27 NOTE — PROGRESS NOTES
Pharmacist Note  Warfarin Dosing  Consult provided for this 73 y.o. female to manage warfarin for VTE prophylaxis s/p orthopedic surgery    INR Goal: 1.7- 2.2    Therapy Day:3    Preop Dose: Yordy Kitchen- none    Drugs that may increase INR: None  Drugs that may decrease INR: None  Other current anticoagulants/ drugs that may increase bleeding risk: NSAID x 4 doses  Risk factors: Age > 65  Daily INR ordered: YES    Recent Labs      05/27/17   0210  05/26/17   0056  05/25/17   2241   HGB  10.4*  11.0*  10.6*   INR  1.5*  1.1   --        Date               INR                 Dose  5/22  1.0  -  5/25                 --                    7.5 mg  5/26                 1.1                 7.5 mg  5/27                 1.5                 2.5 mg       Assessment/ Plan: Will order warfarin 2.5 mg PO x 1 dose. Pharmacy will continue to monitor daily and adjust therapy as indicated.

## 2017-05-27 NOTE — PROGRESS NOTES
Problem: Mobility Impaired (Adult and Pediatric)  Goal: *Acute Goals and Plan of Care (Insert Text)  Physical Therapy Goals  Initiated 5/25/2017    1. Patient will move from supine to sit and sit to supine in bed with independence within 4 days. 2. Patient will perform sit to stand with modified independence within 4 days. 3. Patient will ambulate with modified independence for 150 feet with the least restrictive device within 4 days. 4. Patient will ascend/descend 6 stairs with 2 handrail(s) with supervision/set-up within 4 days. 5. Patient will perform home exercise program per protocol with modified independence within 4 days. 6. Patient will demonstrate AROM 0-90 degrees in operative joint within 4 days. PHYSICAL THERAPY TREATMENT  Patient: Gregory Gore (28 y.o. female)  Date: 5/27/2017  Diagnosis: OA LEFT KNEE   OA (osteoarthritis) of knee Osteoarthritis of right knee  Procedure(s) (LRB):  RIGHT TOTAL KNEE ARTHROPLASTY   (Right) 2 Days Post-Op  Precautions:  Fall, WBAT      ASSESSMENT:  Patient presented sitting in chair in no apparent distress, and was very agreeable to participate in physical therapy. All transfers CGA with additional time, gait belt, and RW for ambulation. BP measures elevated in sitting, but patient asymptomatic. Therapist referred to RN who cleared patient for participation with measures as indicated if asymptomatic. Upon standing patient orthostatic without symptoms, and upon standing for 2 minutes BP measures elevated to WNL and patient remained asymptomatic. Patient transferred to standing CGA with RW and independence with hand placement, ambulated with RW [facility] wheelchair[facility]>stand [facility], [facility] and demonstrated ability with 6 stairs with a step to gait pattern following therapist education. Bilateral UQ support on both hand rails at all times during stair mobility.  Patient completed stair mobility without balance difficulty or questions, reported comprehension of instructions and safety. Patient ambulated with RW back to [facility], and therapist pushed WC back to patient room. Patient transferred sit>stand CGA, ambulated with RW to chair, transferred stand>sit CGA. Patient reported mild nausea sensation, but no other symptoms. Patient left sitting in chair with tray available, in no apparent distress. Patient cleared for ambulation, stair mobility with 24 hour assist for transfers and mobility     Progression toward goals:  [X]    Improving appropriately and progressing toward goals  [ ]    Improving slowly and progressing toward goals  [ ]    Not making progress toward goals and plan of care will be adjusted       PLAN:  Patient continues to benefit from skilled intervention to address the above impairments. Continue treatment per established plan of care. Discharge Recommendations:  Home Health with 24 hour assist for transfers and mobility   Further Equipment Recommendations for Discharge:  rolling walker       SUBJECTIVE:   Patient stated I'm ready to do stairs.       OBJECTIVE DATA SUMMARY:   Critical Behavior:  Neurologic State: Alert  Orientation Level: Oriented X4  Cognition: Follows commands     Functional Mobility Training:  Bed Mobility:        Sit to Supine: Contact guard assistance;Minimum assistance           Transfers:  Sit to Stand: Contact guard assistance; Additional time; Adaptive equipment  Stand to Sit: Contact guard assistance; Adaptive equipment; Additional time                             Balance:  Sitting: Intact; Without support; With support  Standing: Intact; With support  Ambulation/Gait Training:  Distance (ft): 30 Feet (ft)  Assistive Device: Walker, rolling  Ambulation - Level of Assistance: Contact guard assistance; Adaptive equipment; Additional time        Gait Abnormalities: Antalgic; Step to gait        Base of Support: Widened  Stance: Right decreased  Speed/Caroline: Slow  Step Length: Right shortened              Stairs:  Number of Stairs Trained: 6  Stairs - Level of Assistance: Contact guard assistance; Additional time; Adaptive equipment                    Pain:  Pain Scale 1: Numeric (0 - 10)  Pain Intensity 1: 4  Pain Location 1: Knee  Pain Orientation 1: Right  Pain Description 1: Aching  Pain Intervention(s) 1: Medication (see MAR)  Activity Tolerance:   Fair; hypotensive with correction to WNL  Please refer to the flowsheet for vital signs taken during this treatment.   After treatment:   [X]    Patient left in no apparent distress sitting up in chair  [ ]    Patient left in no apparent distress in bed  [X]    Call bell left within reach  [X]    Nursing notified  [ ]    Caregiver present  [ ]    Bed alarm activated      COMMUNICATION/COLLABORATION:   The patients plan of care was discussed with: Registered Nurse     Olamide Rivero, PT   Time Calculation: 28 mins

## 2017-05-27 NOTE — PROGRESS NOTES
Bedside and Verbal shift change report given to Billy Red (oncoming nurse) by Soo Pena (offgoing nurse). Report included the following information SBAR, Kardex, Intake/Output, MAR and Accordion.

## 2017-05-27 NOTE — PROGRESS NOTES
In response to code Brittanie Hacking;     Lactic acidosis in the setting of elevated lactic acid requiring lab monitoring and IVF resuscitation

## 2017-05-27 NOTE — PROGRESS NOTES
POD 2 Days Post-Op    Procedure:  Procedure(s):  RIGHT TOTAL KNEE ARTHROPLASTY      Subjective:     Patient has no complaints today. Objective:     Blood pressure (!) 170/96, pulse 84, temperature 97.8 °F (36.6 °C), resp. rate 16, height 5' 7.5\" (1.715 m), weight 91.2 kg (201 lb), SpO2 97 %. Temp (24hrs), Av.3 °F (36.8 °C), Min:97.8 °F (36.6 °C), Max:98.8 °F (37.1 °C)      Physical Exam:  Examination of the right knee reveals that the incision is clean, dry and intact. Sensation is intact to light touch. Brisk capillary refill. Labs:   Lab Results   Component Value Date/Time    HGB 10.4 2017 02:10 AM         Assessment:     Principal Problem:    Osteoarthritis of right knee (2017)        Plan/Recommendations/Medical Decision Making:     Continue physical therapy  Ice and elevate  Pain control  DVT prophylaxis  Discharge home today.

## 2017-05-27 NOTE — PROGRESS NOTES
Bedside shift change report given to SANDIE Avelar (oncoming nurse) by [facility] (offgoing nurse). Report included the following information SBAR and Kardex.

## 2017-05-29 NOTE — PROGRESS NOTES
Spiritual Care Partner Volunteer visited patient in [address] [place] [address] [address] on 5/26/17. Documented by:  [employer] M.Div.    Paging Service [phone])

## 2017-10-24 ENCOUNTER — HOSPITAL ENCOUNTER (OUTPATIENT)
Dept: MAMMOGRAPHY | Age: 67
Discharge: HOME | End: 2017-10-24
Attending: INTERNAL MEDICINE
Payer: COMMERCIAL

## 2017-10-24 DIAGNOSIS — Z12.31 VISIT FOR SCREENING MAMMOGRAM: ICD-10-CM

## 2017-10-24 PROCEDURE — 77067 SCR MAMMO BI INCL CAD: CPT

## 2019-10-07 ENCOUNTER — HOSPITAL ENCOUNTER (OUTPATIENT)
Dept: MAMMOGRAPHY | Age: 69
Discharge: HOME | End: 2019-10-07
Attending: INTERNAL MEDICINE
Payer: MEDICARE

## 2019-10-07 DIAGNOSIS — Z12.39 SCREENING BREAST EXAMINATION: ICD-10-CM

## 2019-10-07 PROCEDURE — 77067 SCR MAMMO BI INCL CAD: CPT

## 2020-09-29 ENCOUNTER — TRANSCRIBE ORDER (OUTPATIENT)
Dept: SCHEDULING | Age: 70
End: 2020-09-29

## 2020-09-29 DIAGNOSIS — Z12.31 ENCOUNTER FOR MAMMOGRAM TO ESTABLISH BASELINE MAMMOGRAM: Primary | ICD-10-CM

## 2020-10-07 ENCOUNTER — HOSPITAL ENCOUNTER (OUTPATIENT)
Dept: MAMMOGRAPHY | Age: 70
Discharge: HOME | End: 2020-10-07
Attending: INTERNAL MEDICINE
Payer: MEDICARE

## 2020-10-07 DIAGNOSIS — Z12.31 ENCOUNTER FOR MAMMOGRAM TO ESTABLISH BASELINE MAMMOGRAM: ICD-10-CM

## 2020-10-07 PROCEDURE — 77067 SCR MAMMO BI INCL CAD: CPT

## 2021-10-01 ENCOUNTER — TRANSCRIBE ORDER (OUTPATIENT)
Dept: SCHEDULING | Age: 71
End: 2021-10-01

## 2021-10-01 DIAGNOSIS — Z12.31 BREAST CANCER SCREENING BY MAMMOGRAM: Primary | ICD-10-CM

## 2021-10-13 ENCOUNTER — HOSPITAL ENCOUNTER (OUTPATIENT)
Dept: MAMMOGRAPHY | Age: 71
Discharge: HOME | End: 2021-10-13
Attending: INTERNAL MEDICINE
Payer: MEDICARE

## 2021-10-13 DIAGNOSIS — Z12.31 BREAST CANCER SCREENING BY MAMMOGRAM: ICD-10-CM

## 2021-10-13 PROCEDURE — 77067 SCR MAMMO BI INCL CAD: CPT

## 2022-03-20 PROBLEM — M17.11 OSTEOARTHRITIS OF RIGHT KNEE: Status: ACTIVE | Noted: 2017-05-25

## 2022-04-12 ENCOUNTER — APPOINTMENT (OUTPATIENT)
Dept: CT IMAGING | Age: 72
End: 2022-04-12
Attending: STUDENT IN AN ORGANIZED HEALTH CARE EDUCATION/TRAINING PROGRAM
Payer: MEDICARE

## 2022-04-12 ENCOUNTER — HOSPITAL ENCOUNTER (EMERGENCY)
Age: 72
Discharge: HOME | End: 2022-04-12
Attending: STUDENT IN AN ORGANIZED HEALTH CARE EDUCATION/TRAINING PROGRAM
Payer: MEDICARE

## 2022-04-12 VITALS
HEIGHT: 68 IN | WEIGHT: 219 LBS | DIASTOLIC BLOOD PRESSURE: 82 MMHG | SYSTOLIC BLOOD PRESSURE: 124 MMHG | OXYGEN SATURATION: 96 % | HEART RATE: 70 BPM | RESPIRATION RATE: 20 BRPM | TEMPERATURE: 98.8 F | BODY MASS INDEX: 33.19 KG/M2

## 2022-04-12 DIAGNOSIS — R55 SYNCOPE AND COLLAPSE: Primary | ICD-10-CM

## 2022-04-12 LAB
ALBUMIN SERPL-MCNC: 3.9 G/DL (ref 3.5–5)
ALBUMIN/GLOB SERPL: 1 {RATIO} (ref 1.1–2.2)
ALP SERPL-CCNC: 118 U/L (ref 45–117)
ALT SERPL-CCNC: 36 U/L (ref 12–78)
ANION GAP SERPL CALC-SCNC: 7 MMOL/L (ref 5–15)
APPEARANCE UR: CLEAR
AST SERPL-CCNC: 26 U/L (ref 15–37)
ATRIAL RATE: 63 BPM
BASOPHILS # BLD: 0 K/UL (ref 0–0.1)
BASOPHILS NFR BLD: 1 % (ref 0–1)
BILIRUB SERPL-MCNC: 0.4 MG/DL (ref 0.2–1)
BILIRUB UR QL: NEGATIVE
BUN SERPL-MCNC: 11 MG/DL (ref 6–20)
BUN/CREAT SERPL: 11 (ref 12–20)
CALCIUM SERPL-MCNC: 9.1 MG/DL (ref 8.5–10.1)
CALCULATED P AXIS, ECG09: 35 DEGREES
CALCULATED R AXIS, ECG10: 40 DEGREES
CALCULATED T AXIS, ECG11: -5 DEGREES
CHLORIDE SERPL-SCNC: 108 MMOL/L (ref 97–108)
CO2 SERPL-SCNC: 25 MMOL/L (ref 21–32)
COLOR UR: NORMAL
COMMENT, HOLDF: NORMAL
CREAT SERPL-MCNC: 0.99 MG/DL (ref 0.55–1.02)
DIAGNOSIS, 93000: NORMAL
DIFFERENTIAL METHOD BLD: ABNORMAL
EOSINOPHIL # BLD: 0.2 K/UL (ref 0–0.4)
EOSINOPHIL NFR BLD: 3 % (ref 0–7)
ERYTHROCYTE [DISTWIDTH] IN BLOOD BY AUTOMATED COUNT: 19.4 % (ref 11.5–14.5)
GLOBULIN SER CALC-MCNC: 4 G/DL (ref 2–4)
GLUCOSE SERPL-MCNC: 105 MG/DL (ref 65–100)
GLUCOSE UR STRIP.AUTO-MCNC: NEGATIVE MG/DL
HCT VFR BLD AUTO: 40.5 % (ref 35–47)
HGB BLD-MCNC: 11.9 G/DL (ref 11.5–16)
HGB UR QL STRIP: NEGATIVE
IMM GRANULOCYTES # BLD AUTO: 0 K/UL (ref 0–0.04)
IMM GRANULOCYTES NFR BLD AUTO: 0 % (ref 0–0.5)
KETONES UR QL STRIP.AUTO: NEGATIVE MG/DL
LEUKOCYTE ESTERASE UR QL STRIP.AUTO: NEGATIVE
LYMPHOCYTES # BLD: 1.3 K/UL (ref 0.8–3.5)
LYMPHOCYTES NFR BLD: 18 % (ref 12–49)
MCH RBC QN AUTO: 22.6 PG (ref 26–34)
MCHC RBC AUTO-ENTMCNC: 29.4 G/DL (ref 30–36.5)
MCV RBC AUTO: 76.9 FL (ref 80–99)
MONOCYTES # BLD: 0.3 K/UL (ref 0–1)
MONOCYTES NFR BLD: 4 % (ref 5–13)
NEUTS SEG # BLD: 5.3 K/UL (ref 1.8–8)
NEUTS SEG NFR BLD: 74 % (ref 32–75)
NITRITE UR QL STRIP.AUTO: NEGATIVE
NRBC # BLD: 0 K/UL (ref 0–0.01)
NRBC BLD-RTO: 0 PER 100 WBC
P-R INTERVAL, ECG05: 136 MS
PH UR STRIP: 6 [PH] (ref 5–8)
PLATELET # BLD AUTO: 323 K/UL (ref 150–400)
PMV BLD AUTO: 10.6 FL (ref 8.9–12.9)
POTASSIUM SERPL-SCNC: 3.7 MMOL/L (ref 3.5–5.1)
PROT SERPL-MCNC: 7.9 G/DL (ref 6.4–8.2)
PROT UR STRIP-MCNC: NEGATIVE MG/DL
Q-T INTERVAL, ECG07: 454 MS
QRS DURATION, ECG06: 76 MS
QTC CALCULATION (BEZET), ECG08: 464 MS
RBC # BLD AUTO: 5.27 M/UL (ref 3.8–5.2)
SAMPLES BEING HELD,HOLD: NORMAL
SODIUM SERPL-SCNC: 140 MMOL/L (ref 136–145)
SP GR UR REFRACTOMETRY: 1.01 (ref 1–1.03)
TROPONIN-HIGH SENSITIVITY: 78 NG/L (ref 0–51)
TROPONIN-HIGH SENSITIVITY: 81 NG/L (ref 0–51)
UROBILINOGEN UR QL STRIP.AUTO: 0.2 EU/DL (ref 0.2–1)
VENTRICULAR RATE, ECG03: 63 BPM
WBC # BLD AUTO: 7.1 K/UL (ref 3.6–11)

## 2022-04-12 PROCEDURE — 84484 ASSAY OF TROPONIN QUANT: CPT

## 2022-04-12 PROCEDURE — 99284 EMERGENCY DEPT VISIT MOD MDM: CPT

## 2022-04-12 PROCEDURE — 93005 ELECTROCARDIOGRAM TRACING: CPT

## 2022-04-12 PROCEDURE — 85025 COMPLETE CBC W/AUTO DIFF WBC: CPT

## 2022-04-12 PROCEDURE — 80053 COMPREHEN METABOLIC PANEL: CPT

## 2022-04-12 PROCEDURE — 36415 COLL VENOUS BLD VENIPUNCTURE: CPT

## 2022-04-12 PROCEDURE — 81003 URINALYSIS AUTO W/O SCOPE: CPT

## 2022-04-12 PROCEDURE — 70450 CT HEAD/BRAIN W/O DYE: CPT

## 2022-04-12 NOTE — ED PROVIDER NOTES
EMERGENCY DEPARTMENT HISTORY AND PHYSICAL EXAM      Date: 4/12/2022  Patient Name: Dusty Menendez    History of Presenting Illness     Chief Complaint   Patient presents with    Syncope     pt states she was dizzy prior to passing out, states she did not hit her head but woke up by daughter and was laying on the carpet          HPI: Dusty Menendez, 80 y.o. female presents to the ED with cc of passing out. This occurred when she was walking this morning. She had not eaten any breakfast.  She remembers getting lightheaded and like she was tingly all over her head. She then passed out. She does not think that she hit her head but she is not sure. She denies any neck or back pain, no headache. Denies any chest pain, shortness of breath or palpitations prior to the syncope. When she came to, she had a little bit of tingling of her left leg, that has since resolved, no other weakness numbness or tingling in the arms or legs. No speech deficits, no facial asymmetry. No recent abdominal pain vomiting or diarrhea. She does not take any blood thinners. She did take a Benadryl last night, she takes this every now and then due to itching from her eczema. There are no other complaints, changes, or physical findings at this time. PCP: Ken Ponce MD    No current facility-administered medications on file prior to encounter. Current Outpatient Medications on File Prior to Encounter   Medication Sig Dispense Refill    oxyCODONE IR (ROXICODONE) 5 mg immediate release tablet Take 2 Tabs by mouth every four (4) hours as needed for Pain. Max Daily Amount: 60 mg. 60 Tab 0    warfarin (COUMADIN) 2.5 mg tablet Take 1 Tab by mouth daily. 60 Tab 3    omeprazole (PRILOSEC) 40 mg capsule Take 40 mg by mouth daily.  tiotropium-olodaterol (STIOLTO RESPIMAT) 2.5-2.5 mcg/actuation mist Take  by inhalation daily.  losartan (COZAAR) 100 mg tablet Take 100 mg by mouth daily.       multivitamin (ONE A DAY) tablet Take 1 Tab by mouth daily.  DULoxetine (CYMBALTA) 60 mg capsule Take 60 mg by mouth daily.  cevimeline (EVOXAC) 30 mg capsule Take 30 mg by mouth two (2) times a day. 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 Past History     Past Medical History:  Past Medical History:   Diagnosis Date    Aneurysm ([facility] [address]     BRAIN    Arthritis     Autoimmune disease ([facility] [address]     FIBROMYLGIA , SJOGENS    Chronic obstructive pulmonary disease (HCC)     Chronic pain     fibromyalgia    GERD (gastroesophageal reflux disease)     Hypertension     Other ill-defined conditions(799.89)     sjogrens    Other ill-defined conditions(799.89)     fibromuscular DYSPHIA \"NECK VEINS\"    Sleep apnea        Past Surgical History:  Past Surgical History:   Procedure Laterality Date    COLONOSCOPY  2010         HX GI      COLONOSCOPY    HX HYSTERECTOMY      HX KNEE REPLACEMENT Left 2014    HX OOPHORECTOMY      HX ORTHOPAEDIC Right     broken ankle, ORIF (PLATE HAS BEEN REMOVED)    HX TUBAL LIGATION      NEUROLOGICAL PROCEDURE UNLISTED  1994    brain ANEURYSM --DR Catarino Almonte (COILED, NOT ABLE TO BE \"CLIPPED\")       Family History:  Family History   Problem Relation Age of Onset    OSTEOARTHRITIS Mother     Hypertension Mother    Choi Pacemaker Mother     OSTEOARTHRITIS Father     Alcohol abuse Father     OSTEOARTHRITIS Sister     Cancer Brother     OSTEOARTHRITIS Sister     Breast Cancer Maternal Aunt         50's - 63's    Anesth Problems Neg Hx        Social History:  Social History     Tobacco Use    Smoking status: Former Smoker     Packs/day: 1.00     Years: 30.00     Pack years: 30.00     Quit date: 1990     Years since quittin.2    Smokeless tobacco: Never Used   Substance Use Topics    Alcohol use: No    Drug use: No       Allergies:   Allergies   Allergen Reactions    Morphine Nausea and Vomiting         Review of Systems   no fever  No ear pain  No eye pain  no shortness of breath  no chest pain  no abdominal pain  no dysuria  no leg pain  No rash  No lymphadenopathy  No weight loss    Physical Exam   Physical Exam  Constitutional:       General: She is not in acute distress. Appearance: She is not toxic-appearing. HENT:      Head: Normocephalic and atraumatic. Eyes:      Extraocular Movements: Extraocular movements intact. Pupils: Pupils are equal, round, and reactive to light. Cardiovascular:      Rate and Rhythm: Normal rate and regular rhythm. Pulmonary:      Effort: Pulmonary effort is normal.      Breath sounds: Normal breath sounds. Abdominal:      Palpations: Abdomen is soft. Tenderness: There is no abdominal tenderness. Musculoskeletal:         General: No tenderness or deformity. Cervical back: Neck supple. Comments: No bony tenderness of extremities, no tenderness of the spine   Skin:     General: Skin is warm and dry. Neurological:      General: No focal deficit present. Mental Status: She is alert and oriented to person, place, and time. Cranial Nerves: No cranial nerve deficit. Comments: 5/5 strength with bicep flexion and extension bilaterally, 5/5 strength with ankle flexion and extension bilaterally. Sensation to light touch intact over upper and lower extremities bilaterally. Full strength with hip flexion bilaterally. Psychiatric:         Mood and Affect: Mood normal.         Diagnostic Study Results     Labs -   No results found for this or any previous visit (from the past 24 hour(s)). Radiologic Studies -   CT HEAD WO CONT    (Results Pending)     CT Results  (Last 48 hours)    None        CXR Results  (Last 48 hours)    None            Medical Decision Making   I am the first provider for this patient.     I reviewed the vital signs, available nursing notes, past medical history, past surgical history, family history and social history. Vital Signs-Reviewed the patient's vital signs. Patient Vitals for the past 24 hrs:   Temp Pulse Resp BP SpO2   04/12/22 1052 98.8 °F (37.1 °C) 67 17 (!) 158/90 97 %         Provider Notes (Medical Decision Making):   28-year-old female presenting after syncope. Differential includes possible vasovagal syncope, electrolyte or metabolic abnormalities, medication side effect, orthostatic hypotension, arrhythmia. Given her age and possible head trauma, will obtain CT head, her neurologic exam is unremarkable, she denies any headache, unlikely any acute intracranial emergency otherwise. She denies any chest pain or shortness of breath, saturating well on room air, not tachycardic, unlikely any other cardiopulmonary emergency. ED Course:     Initial assessment performed. The patients presenting problems have been discussed, and they are in agreement with the care plan formulated and outlined with them. I have encouraged them to ask questions as they arise throughout their visit. ED Course as of 04/15/22 1559   Tue Apr 12, 2022   1752 Repeat troponin not significantly elevated, will discharge per Dr. Alba Miranda plan. [MB]      ED Course User Index  [MB] Sera Ramirez MD      EKG is performed at 11: 21, shows sinus rhythm at a rate of 63, , QRS 76, QTc 464, axis upright, no ST segment elevation or depression concerning for ACS, questionable T wave inversions in leads V4 through V6. This is interpreted to sinus rhythm with borderline T wave abnormalities. CBC negative for leukocytosis, UA negative for UTI, basic metabolic panel with normal renal function, no worrisome electrolyte abnormalities. Troponin is elevated, however she denies any chest pain, denies complaints on reevaluation. This will be repeated. CT head shows no acute findings. On reevaluation, vitals stable. Patient is counseled on supportive care and return precautions.  Will return to the ED for any worsening lightheadedness, any pain, or any new or worrisome symptoms. Will followup with primary care doctor within 3 days. Critical Care Time:         Disposition:  Home    PLAN:  1. Current Discharge Medication List        2.    Follow-up Information    None       Return to ED if worse     Diagnosis     Clinical Impression: Acute syncope

## 2022-09-29 ENCOUNTER — TRANSCRIBE ORDER (OUTPATIENT)
Dept: SCHEDULING | Age: 72
End: 2022-09-29

## 2022-09-29 DIAGNOSIS — Z12.31 SCREENING MAMMOGRAM FOR HIGH-RISK PATIENT: Primary | ICD-10-CM

## 2023-04-21 DIAGNOSIS — Z12.31 SCREENING MAMMOGRAM FOR HIGH-RISK PATIENT: Primary | ICD-10-CM

## 2023-09-21 ENCOUNTER — TRANSCRIBE ORDERS (OUTPATIENT)
Facility: HOSPITAL | Age: 73
End: 2023-09-21

## 2023-09-21 DIAGNOSIS — Z12.31 SCREENING MAMMOGRAM FOR BREAST CANCER: Primary | ICD-10-CM

## 2023-10-16 ENCOUNTER — HOSPITAL ENCOUNTER (OUTPATIENT)
Facility: HOSPITAL | Age: 73
Discharge: HOME | End: 2023-10-19
Payer: MEDICARE

## 2023-10-16 DIAGNOSIS — Z12.31 SCREENING MAMMOGRAM FOR BREAST CANCER: ICD-10-CM

## 2023-10-16 PROCEDURE — 77067 SCR MAMMO BI INCL CAD: CPT

## 2024-05-29 ENCOUNTER — TRANSCRIBE ORDERS (OUTPATIENT)
Facility: HOSPITAL | Age: 74
End: 2024-05-29

## 2024-05-29 DIAGNOSIS — R60.0 EDEMA OF BOTH LEGS: Primary | ICD-10-CM

## 2024-06-10 ENCOUNTER — HOSPITAL ENCOUNTER (OUTPATIENT)
Facility: HOSPITAL | Age: 74
Discharge: HOME | End: 2024-06-12
Payer: MEDICARE

## 2024-06-10 DIAGNOSIS — R60.0 EDEMA OF BOTH LEGS: ICD-10-CM

## 2024-06-10 PROCEDURE — 93970 EXTREMITY STUDY: CPT

## 2024-06-18 ENCOUNTER — TRANSCRIBE ORDERS (OUTPATIENT)
Dept: SCHEDULING | Age: 74
End: 2024-06-18

## 2024-06-18 DIAGNOSIS — M66.871 RUPTURE OF TENDON, NONTRAUMATIC, FOOT OR ANKLE, RIGHT: Primary | ICD-10-CM

## 2024-06-27 ENCOUNTER — HOSPITAL ENCOUNTER (OUTPATIENT)
Facility: HOSPITAL | Age: 74
Discharge: HOME | End: 2024-06-30
Attending: PODIATRIST

## 2024-06-27 DIAGNOSIS — M66.871 RUPTURE OF TENDON, NONTRAUMATIC, FOOT OR ANKLE, RIGHT: ICD-10-CM

## 2024-06-27 NOTE — PROGRESS NOTES
Pt arrived at [facility] for ankle MRI on 6/27/24 at 5:00pm; pt noted on screening form brain surgery with aneurysm clips from 1997 with no further information about type or MRI compatibility. Explained to patient that sometimes devices can be conditional and we needed more information before scanning. Patient stated understanding and expressed concern that surgery was over 30 years ago and would not have information; explained to patient we are trying to keep her safe and that is our first priority and encouraged her to speak with her primary physician for further help. Patient stated understanding and left amicably.     JOSE R Lechuga RT(R)(MR)

## 2024-10-22 ENCOUNTER — HOSPITAL ENCOUNTER (OUTPATIENT)
Facility: HOSPITAL | Age: 74
Discharge: HOME | End: 2024-10-25
Payer: MEDICARE

## 2024-10-22 VITALS — HEIGHT: 68 IN | WEIGHT: 219 LBS | BODY MASS INDEX: 33.19 KG/M2

## 2024-10-22 DIAGNOSIS — Z12.31 ENCOUNTER FOR SCREENING MAMMOGRAM FOR HIGH-RISK PATIENT: ICD-10-CM

## 2024-10-22 PROCEDURE — 77067 SCR MAMMO BI INCL CAD: CPT

## (undated) DEVICE — SCRUB DRY SURG EZ SCRUB BRUSH PREOPERATIVE GRN

## (undated) DEVICE — 3M™ IOBAN™ 2 ANTIMICROBIAL INCISE DRAPE 6651EZ: Brand: IOBAN™ 2

## (undated) DEVICE — HANDPIECE SET WITH BONE CLEANING TIP AND SUCTION TUBE: Brand: INTERPULSE

## (undated) DEVICE — 2108 SERIES SAGITTAL BLADE AGGRESSIVE  (25.0 X 1.19 X 85.0MM)

## (undated) DEVICE — FRAZIER SUCTION INSTRUMENT 12 FR W/CONTROL VENT & OBTURATOR: Brand: FRAZIER

## (undated) DEVICE — NEEDLE HYPO 22GA L1.5IN BLK S STL HUB POLYPR SHLD REG BVL

## (undated) DEVICE — INFECTION CONTROL KIT SYS

## (undated) DEVICE — DRAPE,EXTREMITY,89X128,STERILE: Brand: MEDLINE

## (undated) DEVICE — SOLUTION IRRIG 1000ML H2O STRL BLT

## (undated) DEVICE — Z CONVERTED USE 2271043 CONTAINER SPEC COLL 4OZ SCR ON LID PEEL PCH

## (undated) DEVICE — SUTURE VCRL SZ 2-0 L36IN ABSRB UD L40MM CT 1/2 CIR J957H

## (undated) DEVICE — DEVON™ KNEE AND BODY STRAP 60" X 3" (1.5 M X 7.6 CM): Brand: DEVON

## (undated) DEVICE — COVER,MAYO STAND,STERILE: Brand: MEDLINE

## (undated) DEVICE — 3M™ DURAPORE™ SURGICAL TAPE 2 INCHES X 10YARDS (5.0CM X 9.1M) 6ROLLS/CARTON 10CARTONS/CASE 1538-2: Brand: 3M™ DURAPORE™

## (undated) DEVICE — SUTURE STRATAFIX SYMMETRIC PDS + SZ 1 L18IN ABSRB VLT L48MM SXPP1A400

## (undated) DEVICE — 3M™ STERI-DRAPE™ U-DRAPE 1015: Brand: STERI-DRAPE™

## (undated) DEVICE — SYRINGE MED 20ML STD CLR PLAS LUERLOCK TIP N CTRL DISP

## (undated) DEVICE — GOWN,SIRUS,POLYRNF,BRTHSLV,XL,30/CS: Brand: MEDLINE

## (undated) DEVICE — Device

## (undated) DEVICE — HANDLE LT SNAP ON ULT DURABLE LENS FOR TRUMPF ALC DISPOSABLE

## (undated) DEVICE — SUTURE VCRL SZ 2 L54IN ABSRB UD L65MM TP-1 1/2 CIR J880T

## (undated) DEVICE — PADDING CAST SPEC 6INX4YD COT --

## (undated) DEVICE — STRYKER PERFORMANCE SERIES SAGITTAL BLADE: Brand: STRYKER PERFORMANCE SERIES

## (undated) DEVICE — SOLUTION IRRIG 3000ML 0.9% SOD CHL FLX CONT 0797208] ICU MEDICAL INC]

## (undated) DEVICE — STERILE POLYISOPRENE POWDER-FREE SURGICAL GLOVES WITH EMOLLIENT COATING: Brand: PROTEXIS

## (undated) DEVICE — (D)STRIP SKN CLSR 0.5X4IN WHT --

## (undated) DEVICE — HOOK LOCK LATEX FREE ELASTIC BANDAGE D/L 6INX10YD

## (undated) DEVICE — PREP SKN PREVAIL 40ML APPL --

## (undated) DEVICE — PADDING CST 4INX4YD --

## (undated) DEVICE — TRAY CATH 16F URIN MTR LTX -- CONVERT TO ITEM 363111

## (undated) DEVICE — SUTURE MCRYL SZ 4-0 L27IN ABSRB UD L24MM PS-1 3/8 CIR PRIM Y935H

## (undated) DEVICE — REM POLYHESIVE ADULT PATIENT RETURN ELECTRODE: Brand: VALLEYLAB

## (undated) DEVICE — CUSTOM CAST PD STR